# Patient Record
Sex: MALE | Race: WHITE | NOT HISPANIC OR LATINO | URBAN - METROPOLITAN AREA
[De-identification: names, ages, dates, MRNs, and addresses within clinical notes are randomized per-mention and may not be internally consistent; named-entity substitution may affect disease eponyms.]

---

## 2023-03-07 ENCOUNTER — HOSPITAL ENCOUNTER (INPATIENT)
Facility: HOSPITAL | Age: 63
LOS: 7 days | Discharge: NURSING, REHAB OR LONG-TERM CARE | DRG: 065 | End: 2023-03-15
Attending: EMERGENCY MEDICINE | Admitting: STUDENT IN AN ORGANIZED HEALTH CARE EDUCATION/TRAINING PROGRAM
Payer: MEDICARE

## 2023-03-07 DIAGNOSIS — R07.9 CHEST PAIN: ICD-10-CM

## 2023-03-07 DIAGNOSIS — R26.2 AMBULATORY DYSFUNCTION: Primary | ICD-10-CM

## 2023-03-07 DIAGNOSIS — R53.1 GENERALIZED WEAKNESS: ICD-10-CM

## 2023-03-07 DIAGNOSIS — I63.9 STROKE: ICD-10-CM

## 2023-03-07 LAB
ALBUMIN SERPL-MCNC: 3.2 G/DL (ref 3.4–4.8)
ALBUMIN/GLOB SERPL: 1.1 RATIO (ref 1.1–2)
ALP SERPL-CCNC: 102 UNIT/L (ref 40–150)
ALT SERPL-CCNC: 25 UNIT/L (ref 0–55)
APPEARANCE UR: ABNORMAL
AST SERPL-CCNC: 19 UNIT/L (ref 5–34)
BACTERIA #/AREA URNS AUTO: ABNORMAL /HPF
BASOPHILS # BLD AUTO: 0.05 X10(3)/MCL (ref 0–0.2)
BASOPHILS NFR BLD AUTO: 0.5 %
BILIRUB UR QL STRIP.AUTO: NEGATIVE MG/DL
BILIRUBIN DIRECT+TOT PNL SERPL-MCNC: 0.9 MG/DL
BUN SERPL-MCNC: 5.8 MG/DL (ref 8.4–25.7)
CALCIUM SERPL-MCNC: 8.8 MG/DL (ref 8.8–10)
CHLORIDE SERPL-SCNC: 105 MMOL/L (ref 98–107)
CK SERPL-CCNC: 49 U/L (ref 30–200)
CO2 SERPL-SCNC: 25 MMOL/L (ref 23–31)
COLOR UR AUTO: ABNORMAL
CREAT SERPL-MCNC: 0.69 MG/DL (ref 0.73–1.18)
EOSINOPHIL # BLD AUTO: 0.23 X10(3)/MCL (ref 0–0.9)
EOSINOPHIL NFR BLD AUTO: 2.4 %
ERYTHROCYTE [DISTWIDTH] IN BLOOD BY AUTOMATED COUNT: 13.3 % (ref 11.5–17)
GFR SERPLBLD CREATININE-BSD FMLA CKD-EPI: >60 MLS/MIN/1.73/M2
GLOBULIN SER-MCNC: 2.9 GM/DL (ref 2.4–3.5)
GLUCOSE SERPL-MCNC: 95 MG/DL (ref 82–115)
GLUCOSE UR QL STRIP.AUTO: NORMAL MG/DL
HCT VFR BLD AUTO: 46.3 % (ref 42–52)
HGB BLD-MCNC: 15.5 G/DL (ref 14–18)
HYALINE CASTS #/AREA URNS LPF: ABNORMAL /LPF
IMM GRANULOCYTES # BLD AUTO: 0.04 X10(3)/MCL (ref 0–0.04)
IMM GRANULOCYTES NFR BLD AUTO: 0.4 %
KETONES UR QL STRIP.AUTO: NEGATIVE MG/DL
LEUKOCYTE ESTERASE UR QL STRIP.AUTO: NEGATIVE UNIT/L
LYMPHOCYTES # BLD AUTO: 1.78 X10(3)/MCL (ref 0.6–4.6)
LYMPHOCYTES NFR BLD AUTO: 18.3 %
MAGNESIUM SERPL-MCNC: 2.1 MG/DL (ref 1.6–2.6)
MCH RBC QN AUTO: 29.6 PG
MCHC RBC AUTO-ENTMCNC: 33.5 G/DL (ref 33–36)
MCV RBC AUTO: 88.4 FL (ref 80–94)
MONOCYTES # BLD AUTO: 0.82 X10(3)/MCL (ref 0.1–1.3)
MONOCYTES NFR BLD AUTO: 8.4 %
MUCOUS THREADS URNS QL MICRO: ABNORMAL /LPF
NEUTROPHILS # BLD AUTO: 6.81 X10(3)/MCL (ref 2.1–9.2)
NEUTROPHILS NFR BLD AUTO: 70 %
NITRITE UR QL STRIP.AUTO: NEGATIVE
NRBC BLD AUTO-RTO: 0 %
PH UR STRIP.AUTO: 8 [PH]
PLATELET # BLD AUTO: 200 X10(3)/MCL (ref 130–400)
PMV BLD AUTO: 11.2 FL (ref 7.4–10.4)
POTASSIUM SERPL-SCNC: 3 MMOL/L (ref 3.5–5.1)
PROT SERPL-MCNC: 6.1 GM/DL (ref 5.8–7.6)
PROT UR QL STRIP.AUTO: NEGATIVE MG/DL
RBC # BLD AUTO: 5.24 X10(6)/MCL (ref 4.7–6.1)
RBC #/AREA URNS AUTO: ABNORMAL /HPF
RBC UR QL AUTO: NEGATIVE UNIT/L
SODIUM SERPL-SCNC: 140 MMOL/L (ref 136–145)
SP GR UR STRIP.AUTO: 1.01
SQUAMOUS #/AREA URNS LPF: ABNORMAL /HPF
TSH SERPL-ACNC: 1.59 UIU/ML (ref 0.35–4.94)
UNIDENT CRYS #/AREA URNS HPF: ABNORMAL /HPF
UROBILINOGEN UR STRIP-ACNC: ABNORMAL MG/DL
WBC # SPEC AUTO: 9.7 X10(3)/MCL (ref 4.5–11.5)
WBC #/AREA URNS AUTO: ABNORMAL /HPF
YEAST BUDDING URNS QL: ABNORMAL /HPF

## 2023-03-07 PROCEDURE — 83735 ASSAY OF MAGNESIUM: CPT | Performed by: EMERGENCY MEDICINE

## 2023-03-07 PROCEDURE — 63600175 PHARM REV CODE 636 W HCPCS: Performed by: EMERGENCY MEDICINE

## 2023-03-07 PROCEDURE — 93005 ELECTROCARDIOGRAM TRACING: CPT

## 2023-03-07 PROCEDURE — 85025 COMPLETE CBC W/AUTO DIFF WBC: CPT | Performed by: EMERGENCY MEDICINE

## 2023-03-07 PROCEDURE — 99285 EMERGENCY DEPT VISIT HI MDM: CPT | Mod: 25

## 2023-03-07 PROCEDURE — 51798 US URINE CAPACITY MEASURE: CPT

## 2023-03-07 PROCEDURE — 80053 COMPREHEN METABOLIC PANEL: CPT | Performed by: EMERGENCY MEDICINE

## 2023-03-07 PROCEDURE — 81001 URINALYSIS AUTO W/SCOPE: CPT | Performed by: EMERGENCY MEDICINE

## 2023-03-07 PROCEDURE — 96360 HYDRATION IV INFUSION INIT: CPT

## 2023-03-07 PROCEDURE — 80307 DRUG TEST PRSMV CHEM ANLYZR: CPT | Performed by: STUDENT IN AN ORGANIZED HEALTH CARE EDUCATION/TRAINING PROGRAM

## 2023-03-07 PROCEDURE — 84443 ASSAY THYROID STIM HORMONE: CPT | Performed by: EMERGENCY MEDICINE

## 2023-03-07 PROCEDURE — 82550 ASSAY OF CK (CPK): CPT | Performed by: EMERGENCY MEDICINE

## 2023-03-07 RX ADMIN — SODIUM CHLORIDE, POTASSIUM CHLORIDE, SODIUM LACTATE AND CALCIUM CHLORIDE 1000 ML: 600; 310; 30; 20 INJECTION, SOLUTION INTRAVENOUS at 10:03

## 2023-03-07 NOTE — Clinical Note
Diagnosis: Ambulatory dysfunction [[id number]]   Future Attending Provider: TAWNY CLEARY [[id number]]   Admitting Provider:: TAWNY CLEARY [[id number]]   Special Needs:: No Special Needs [1]

## 2023-03-08 PROBLEM — I63.81 LACUNAR STROKE, ACUTE: Status: ACTIVE | Noted: 2023-03-08

## 2023-03-08 PROBLEM — I10 HYPERTENSION: Status: ACTIVE | Noted: 2023-03-08

## 2023-03-08 PROBLEM — R62.7 FAILURE TO THRIVE IN ADULT: Status: ACTIVE | Noted: 2023-03-08

## 2023-03-08 PROBLEM — R29.898 WEAKNESS OF LEFT LOWER EXTREMITY: Status: ACTIVE | Noted: 2023-03-08

## 2023-03-08 LAB
AMPHET UR QL SCN: NEGATIVE
AORTIC ROOT ANNULUS: 3.5 CM
AV INDEX (PROSTH): 0.88
AV MEAN GRADIENT: 2 MMHG
AV PEAK GRADIENT: 4 MMHG
AV VALVE AREA: 2.71 CM2
AV VELOCITY RATIO: 0.91
BARBITURATE SCN PRESENT UR: NEGATIVE
BENZODIAZ UR QL SCN: NEGATIVE
CANNABINOIDS UR QL SCN: NEGATIVE
COCAINE UR QL SCN: NEGATIVE
CRP SERPL-MCNC: 36.4 MG/L
DOP CALC AO PEAK VEL: 1.05 M/S
DOP CALC AO VTI: 23.7 CM
DOP CALC LVOT AREA: 3.1 CM2
DOP CALC LVOT DIAMETER: 1.98 CM
DOP CALC LVOT PEAK VEL: 0.96 M/S
DOP CALC LVOT STROKE VOLUME: 64.32 CM3
DOP CALC MV VTI: 22.3 CM
DOP CALCLVOT PEAK VEL VTI: 20.9 CM
E WAVE DECELERATION TIME: 130.91 MSEC
E/A RATIO: 0.82
E/E' RATIO: 4.48 M/S
EJECTION FRACTION: 60 %
FENTANYL UR QL SCN: NEGATIVE
HR MV ECHO: 68 BPM
LEFT ATRIUM SIZE: 3.58 CM
LEFT ATRIUM VOLUME MOD: 30 CM3
LEFT VENTRICLE DIASTOLIC VOLUME: 75 ML
LEFT VENTRICLE SYSTOLIC VOLUME: 33 ML
LV LATERAL E/E' RATIO: 3.62 M/S
LV SEPTAL E/E' RATIO: 5.88 M/S
LVOT MG: 1.96 MMHG
LVOT MV: 0.66 CM/S
MAGNESIUM SERPL-MCNC: 2.1 MG/DL (ref 1.6–2.6)
MDMA UR QL SCN: NEGATIVE
MV MEAN GRADIENT: 1 MMHG
MV PEAK A VEL: 0.57 M/S
MV PEAK E VEL: 0.47 M/S
MV PEAK GRADIENT: 3 MMHG
MV VALVE AREA BY CONTINUITY EQUATION: 2.88 CM2
OPIATES UR QL SCN: NEGATIVE
PCP UR QL: NEGATIVE
PH UR: 8 [PH] (ref 3–11)
PHOSPHATE SERPL-MCNC: 3.3 MG/DL (ref 2.3–4.7)
RA PRESSURE: 3 MMHG
RA WIDTH: 4.4 CM
TDI LATERAL: 0.13 M/S
TDI SEPTAL: 0.08 M/S
TDI: 0.11 M/S
TRICUSPID ANNULAR PLANE SYSTOLIC EXCURSION: 1.72 CM

## 2023-03-08 PROCEDURE — 94640 AIRWAY INHALATION TREATMENT: CPT

## 2023-03-08 PROCEDURE — 86140 C-REACTIVE PROTEIN: CPT

## 2023-03-08 PROCEDURE — 25500020 PHARM REV CODE 255

## 2023-03-08 PROCEDURE — 99900035 HC TECH TIME PER 15 MIN (STAT)

## 2023-03-08 PROCEDURE — 25000003 PHARM REV CODE 250

## 2023-03-08 PROCEDURE — 21400001 HC TELEMETRY ROOM

## 2023-03-08 PROCEDURE — 25000003 PHARM REV CODE 250: Performed by: STUDENT IN AN ORGANIZED HEALTH CARE EDUCATION/TRAINING PROGRAM

## 2023-03-08 PROCEDURE — A9577 INJ MULTIHANCE: HCPCS

## 2023-03-08 PROCEDURE — 63600175 PHARM REV CODE 636 W HCPCS: Performed by: FAMILY MEDICINE

## 2023-03-08 PROCEDURE — 84100 ASSAY OF PHOSPHORUS: CPT | Performed by: FAMILY MEDICINE

## 2023-03-08 PROCEDURE — 25000242 PHARM REV CODE 250 ALT 637 W/ HCPCS: Performed by: FAMILY MEDICINE

## 2023-03-08 PROCEDURE — 83735 ASSAY OF MAGNESIUM: CPT | Performed by: FAMILY MEDICINE

## 2023-03-08 RX ORDER — POTASSIUM CHLORIDE 20 MEQ/1
40 TABLET, EXTENDED RELEASE ORAL EVERY 6 HOURS
Status: COMPLETED | OUTPATIENT
Start: 2023-03-08 | End: 2023-03-08

## 2023-03-08 RX ORDER — CLOPIDOGREL BISULFATE 75 MG/1
75 TABLET ORAL DAILY
Status: DISCONTINUED | OUTPATIENT
Start: 2023-03-08 | End: 2023-03-15 | Stop reason: HOSPADM

## 2023-03-08 RX ORDER — ATORVASTATIN CALCIUM 40 MG/1
80 TABLET, FILM COATED ORAL NIGHTLY
Status: DISCONTINUED | OUTPATIENT
Start: 2023-03-08 | End: 2023-03-15 | Stop reason: HOSPADM

## 2023-03-08 RX ORDER — ENOXAPARIN SODIUM 100 MG/ML
40 INJECTION SUBCUTANEOUS EVERY 24 HOURS
Status: DISCONTINUED | OUTPATIENT
Start: 2023-03-08 | End: 2023-03-15 | Stop reason: HOSPADM

## 2023-03-08 RX ORDER — LABETALOL HCL 20 MG/4 ML
10 SYRINGE (ML) INTRAVENOUS EVERY 4 HOURS PRN
Status: DISCONTINUED | OUTPATIENT
Start: 2023-03-08 | End: 2023-03-15 | Stop reason: HOSPADM

## 2023-03-08 RX ORDER — ASPIRIN 325 MG
325 TABLET ORAL ONCE
Status: COMPLETED | OUTPATIENT
Start: 2023-03-08 | End: 2023-03-08

## 2023-03-08 RX ORDER — NAPROXEN SODIUM 220 MG/1
81 TABLET, FILM COATED ORAL DAILY
Status: DISCONTINUED | OUTPATIENT
Start: 2023-03-09 | End: 2023-03-15 | Stop reason: HOSPADM

## 2023-03-08 RX ORDER — NALOXONE HCL 0.4 MG/ML
0.02 VIAL (ML) INJECTION
Status: DISCONTINUED | OUTPATIENT
Start: 2023-03-08 | End: 2023-03-15 | Stop reason: HOSPADM

## 2023-03-08 RX ORDER — ASPIRIN 325 MG
325 TABLET ORAL DAILY
Status: DISCONTINUED | OUTPATIENT
Start: 2023-03-08 | End: 2023-03-08

## 2023-03-08 RX ORDER — HYDRALAZINE HYDROCHLORIDE 20 MG/ML
10 INJECTION INTRAMUSCULAR; INTRAVENOUS EVERY 4 HOURS PRN
Status: DISCONTINUED | OUTPATIENT
Start: 2023-03-08 | End: 2023-03-15 | Stop reason: HOSPADM

## 2023-03-08 RX ORDER — SODIUM CHLORIDE 0.9 % (FLUSH) 0.9 %
10 SYRINGE (ML) INJECTION EVERY 12 HOURS PRN
Status: DISCONTINUED | OUTPATIENT
Start: 2023-03-08 | End: 2023-03-15 | Stop reason: HOSPADM

## 2023-03-08 RX ORDER — IPRATROPIUM BROMIDE AND ALBUTEROL SULFATE 2.5; .5 MG/3ML; MG/3ML
3 SOLUTION RESPIRATORY (INHALATION) EVERY 6 HOURS PRN
Status: DISCONTINUED | OUTPATIENT
Start: 2023-03-08 | End: 2023-03-09

## 2023-03-08 RX ADMIN — POTASSIUM CHLORIDE 40 MEQ: 1500 TABLET, EXTENDED RELEASE ORAL at 11:03

## 2023-03-08 RX ADMIN — CLOPIDOGREL BISULFATE 75 MG: 75 TABLET, FILM COATED ORAL at 11:03

## 2023-03-08 RX ADMIN — POTASSIUM CHLORIDE 40 MEQ: 1500 TABLET, EXTENDED RELEASE ORAL at 06:03

## 2023-03-08 RX ADMIN — ENOXAPARIN SODIUM 40 MG: 40 INJECTION SUBCUTANEOUS at 06:03

## 2023-03-08 RX ADMIN — IPRATROPIUM BROMIDE AND ALBUTEROL SULFATE 3 ML: 2.5; .5 SOLUTION RESPIRATORY (INHALATION) at 02:03

## 2023-03-08 RX ADMIN — ASPIRIN 325 MG ORAL TABLET 325 MG: 325 PILL ORAL at 11:03

## 2023-03-08 RX ADMIN — GADOBENATE DIMEGLUMINE 16 ML: 529 INJECTION, SOLUTION INTRAVENOUS at 09:03

## 2023-03-08 RX ADMIN — ATORVASTATIN CALCIUM 80 MG: 40 TABLET, FILM COATED ORAL at 09:03

## 2023-03-08 NOTE — ED PROVIDER NOTES
Encounter Date: 3/7/2023       History     Chief Complaint   Patient presents with    Fall     Pt arrives via [facility] after he states he fell on Monday and is complaining of lower extremity weakness and back pain; pt also states that he has been unable to walk x1 year and lives alone     Mr. Geoffrey Smith is a 63 yo male no known PMHx who presents with chief complaint weakness. Onset was year and a half ago when patient states he began having some weakness in his legs that has been constant.  He reports that due to the weakness in his legs he has had multiple falls.  EMS reports that he would reportedly fallen a while back and had laid on the floor of his camper for about 5 days until a neighbor finally found him and brought him to his brother's house to live.  His brother called EMS because he was unable to care for him, and that he had been urinating and defecating on himself.  Patient states that he feels the urge to urinate and have a bowel movement, but is unable to make it to the bathroom in time due to his weakness so he ends up going to the bathroom on himself.  He states that this has been going on for at least 6 months.  He does report having some mid low back pain that has been chronic for at least 5 years, has not changed as result of his falls. Denies having any numbness or tingling in extremities, dysuria, saddle anesthesia, fever, nausea, vomiting.    The history is provided by the patient and the EMS personnel.   Review of patient's allergies indicates:  No Known Allergies  History reviewed. No pertinent past medical history.  No past surgical history on file.  History reviewed. No pertinent family history.     Review of Systems    Physical Exam     Initial Vitals [03/07/23 2153]   BP Pulse Resp Temp SpO2   (!) 180/116 89 18 98.2 °F (36.8 °C) 97 %      MAP       --         Physical Exam    Nursing note and vitals reviewed.  Constitutional: He appears well-developed and well-nourished. No distress.    HENT:   Head: Normocephalic and atraumatic.   Nose: Nose normal.   Mouth/Throat: Oropharynx is clear and moist and mucous membranes are normal.   Eyes: Conjunctivae and EOM are normal. Pupils are equal, round, and reactive to light.   Neck: Neck supple. No tracheal deviation present.   Cardiovascular:  Normal rate, regular rhythm, normal heart sounds, intact distal pulses and normal pulses.           Pulmonary/Chest: Effort normal and breath sounds normal. No respiratory distress.   Abdominal: Abdomen is soft. There is no abdominal tenderness. There is no rebound and no guarding.   Musculoskeletal:         General: Normal range of motion.      Cervical back: Neck supple. No bony tenderness.      Thoracic back: No bony tenderness.      Lumbar back: No bony tenderness.     Neurological: He is alert. He is disoriented. No cranial nerve deficit or sensory deficit. He exhibits abnormal muscle tone. GCS score is 15. GCS eye subscore is 4. GCS verbal subscore is 4. GCS motor subscore is 6.   Reflex Scores:       Tricep reflexes are 2+ on the right side and 2+ on the left side.       Bicep reflexes are 2+ on the right side and 2+ on the left side.       Patellar reflexes are 2+ on the right side and 2+ on the left side.  Left hemiparesis. Oriented to person and place only.   Skin: Skin is warm, dry and intact.   Psychiatric: He has a normal mood and affect. His speech is normal and behavior is normal. Judgment and thought content normal. Cognition and memory are impaired.       ED Course   Procedures  Labs Reviewed   COMPREHENSIVE METABOLIC PANEL - Abnormal; Notable for the following components:       Result Value    Potassium Level 3.0 (*)     Blood Urea Nitrogen 5.8 (*)     Creatinine 0.69 (*)     Albumin Level 3.2 (*)     All other components within normal limits   URINALYSIS, REFLEX TO URINE CULTURE - Abnormal; Notable for the following components:    Appearance, UA Turbid (*)     Urobilinogen, UA 2+ (*)     Budding  Yeast, UA Occ (*)     Mucous, UA Trace (*)     Unclassified Crystal, UA Occ (*)     All other components within normal limits   CBC WITH DIFFERENTIAL - Abnormal; Notable for the following components:    MPV 11.2 (*)     All other components within normal limits   MAGNESIUM - Normal   TSH - Normal   CK - Normal   CBC W/ AUTO DIFFERENTIAL    Narrative:     The following orders were created for panel order CBC auto differential.  Procedure                               Abnormality         Status                     ---------                               -----------         ------                     CBC with Differential[[id number]]        Abnormal            Final result                 Please view results for these tests on the individual orders.   EXTRA TUBES    Narrative:     The following orders were created for panel order EXTRA TUBES.  Procedure                               Abnormality         Status                     ---------                               -----------         ------                     Light Blue Top Hold[[id number]]                              In process                   Please view results for these tests on the individual orders.   LIGHT BLUE TOP HOLD          Imaging Results              CT Lumbar Spine Without Contrast (Preliminary result)  Result time 03/08/23 01:32:36      Preliminary result by Gregorio Vigil Jr., MD (03/08/23 01:32:36)                   Narrative:    START OF REPORT:  TECHNIQUE: CT OF THE LUMBAR SPINE WAS PERFORMED WITHOUT INTRAVENOUS CONTRAST WITH DIRECT AXIAL AS WELL AS SAGITTAL AND CORONAL RECONSTRUCTION IMAGES.    COMPARISON: NONE.    CLINICAL HISTORY: FALL (PT ARRIVES VIA [facility] AFTER HE STATES HE FELL ON MONDAY AND IS COMPLAINING OF LOWER EXTREMITY WEAKNESS AND BACK PAIN; PT ALSO STATES THAT HE HAS BEEN UNABLE TO WALK X1 YEAR AND LIVES ALONE.    Findings:  Anatomy: Unremarkable.  Mineralization: The bony mineralization is within normal limits.  Congenital:  None.  Bone alignment: Unremarkable with no significant listhesis.  Curvature: There is straightening of the lumbar lordotic curvature. This may be positional or reflect an element of myospasm.  Bone and bone marrow: The vertebral body heights are maintained.  Intervertebral disc spaces: The intervertebral discs are preserved throughout.  Osteophytes: There are tiny marginal osteophytes at L2 down through L5.  Facet degenerative changes: Mild facet degenerative changes are seen at L4-L5 L5-S1.  Spinal canal: Unremarkable with no bony spinal canal stenosis identified.  Fractures: No acute fracture dislocation or subluxation is seen.  Orthopedic Hardware: None.  Vertebral Fusion: None.      Impression:  1. No acute fracture dislocation or subluxation is seen.  2. Degenerative changes and other findings as above.                          Preliminary result by Interface, Rad Results In (03/08/23 01:32:36)                   Narrative:    START OF REPORT:  TECHNIQUE: CT OF THE LUMBAR SPINE WAS PERFORMED WITHOUT INTRAVENOUS CONTRAST WITH DIRECT AXIAL AS WELL AS SAGITTAL AND CORONAL RECONSTRUCTION IMAGES.    COMPARISON: NONE.    CLINICAL HISTORY: FALL (PT ARRIVES VIA [facility] AFTER HE STATES HE FELL ON MONDAY AND IS COMPLAINING OF LOWER EXTREMITY WEAKNESS AND BACK PAIN; PT ALSO STATES THAT HE HAS BEEN UNABLE TO WALK X1 YEAR AND LIVES ALONE.    Findings:  Anatomy: Unremarkable.  Mineralization: The bony mineralization is within normal limits.  Congenital: None.  Bone alignment: Unremarkable with no significant listhesis.  Curvature: There is straightening of the lumbar lordotic curvature. This may be positional or reflect an element of myospasm.  Bone and bone marrow: The vertebral body heights are maintained.  Intervertebral disc spaces: The intervertebral discs are preserved throughout.  Osteophytes: There are tiny marginal osteophytes at L2 down through L5.  Facet degenerative changes: Mild facet degenerative changes are seen  at L4-L5 L5-S1.  Spinal canal: Unremarkable with no bony spinal canal stenosis identified.  Fractures: No acute fracture dislocation or subluxation is seen.  Orthopedic Hardware: None.  Vertebral Fusion: None.      Impression:  1. No acute fracture dislocation or subluxation is seen.  2. Degenerative changes and other findings as above.                                         CT Head Without Contrast (Preliminary result)  Result time 03/07/23 23:56:15      Preliminary result by Gregorio Vigil Jr., MD (03/07/23 23:56:15)                   Narrative:    START OF REPORT:  TECHNIQUE: CT OF THE HEAD WAS PERFORMED WITHOUT INTRAVENOUS CONTRAST WITH AXIAL AS WELL AS CORONAL AND SAGITTAL IMAGES.    COMPARISON: NONE.    DOSAGE INFORMATION: AUTOMATED EXPOSURE CONTROL WAS UTILIZED.    CLINICAL HISTORY: FALL (PT ARRIVES VIA [facility] AFTER HE STATES HE FELL ON MONDAY AND IS COMPLAINING OF LOWER EXTREMITY WEAKNESS AND BACK PAIN; PT ALSO STATES THAT HE HAS BEEN UNABLE TO WALK X1 YEAR AND LIVES ALONE.    Findings:  Hemorrhage: No acute intracranial hemorrhage is seen.  CSF spaces: The ventricles, sulci and basal cisterns all appear moderately prominent consistent with global cerebral atrophy.  Brain parenchyma: Pronounced microvascular change is seen in portions of the periventricular and deep white matter tracts.  Cerebellum: Unremarkable.  Sella and skull base: The sella appears to be within normal limits for age.  Intracranial calcifications: Incidental note is made of bilateral choroid plexus calcification. Incidental note is made of some pineal region calcification.  Calvarium: No acute linear or depressed skull fracture is seen.    Maxillofacial Structures:  Paranasal sinuses: The visualized paranasal sinuses appear clear with no significant mucoperiosteal thickening or air fluid levels identified.  Orbits: The orbits appear unremarkable.  Zygomatic arches: The zygomatic arches are intact and unremarkable.  Temporal bones  and mastoids: The temporal bones and mastoids appear unremarkable.  TMJ: The mandibular condyles appear normally placed with respect to the mandibular fossa.      Impression:  1. No acute intracranial process identified. Details and findings as noted above.                          Preliminary result by Interface, Rad Results In (03/07/23 23:56:15)                   Narrative:    START OF REPORT:  TECHNIQUE: CT OF THE HEAD WAS PERFORMED WITHOUT INTRAVENOUS CONTRAST WITH AXIAL AS WELL AS CORONAL AND SAGITTAL IMAGES.    COMPARISON: NONE.    DOSAGE INFORMATION: AUTOMATED EXPOSURE CONTROL WAS UTILIZED.    CLINICAL HISTORY: FALL (PT ARRIVES VIA [facility] AFTER HE STATES HE FELL ON MONDAY AND IS COMPLAINING OF LOWER EXTREMITY WEAKNESS AND BACK PAIN; PT ALSO STATES THAT HE HAS BEEN UNABLE TO WALK X1 YEAR AND LIVES ALONE.    Findings:  Hemorrhage: No acute intracranial hemorrhage is seen.  CSF spaces: The ventricles, sulci and basal cisterns all appear moderately prominent consistent with global cerebral atrophy.  Brain parenchyma: Pronounced microvascular change is seen in portions of the periventricular and deep white matter tracts.  Cerebellum: Unremarkable.  Sella and skull base: The sella appears to be within normal limits for age.  Intracranial calcifications: Incidental note is made of bilateral choroid plexus calcification. Incidental note is made of some pineal region calcification.  Calvarium: No acute linear or depressed skull fracture is seen.    Maxillofacial Structures:  Paranasal sinuses: The visualized paranasal sinuses appear clear with no significant mucoperiosteal thickening or air fluid levels identified.  Orbits: The orbits appear unremarkable.  Zygomatic arches: The zygomatic arches are intact and unremarkable.  Temporal bones and mastoids: The temporal bones and mastoids appear unremarkable.  TMJ: The mandibular condyles appear normally placed with respect to the mandibular fossa.      Impression:  1. No  acute intracranial process identified. Details and findings as noted above.                                         X-Ray Chest AP Portable (Final result)  Result time 03/07/23 22:26:35      Final result by Sharath Tello MD (03/07/23 22:26:35)                   Impression:      NO ACUTE CARDIOPULMONARY PROCESS IDENTIFIED.      Electronically signed by: Sharath Tello  Date:    03/07/2023  Time:    22:26               Narrative:    EXAMINATION:  XR CHEST AP PORTABLE    CLINICAL HISTORY:  smoker;    TECHNIQUE:  One view    COMPARISON:  None available.    FINDINGS:  Cardiopericardial silhouette is within normal limits. Lungs are without dense focal or segmental consolidation, congestive process, pleural effusions or pneumothorax.                                       Medications   sodium chloride 0.9% flush 10 mL (has no administration in time range)   enoxaparin injection 40 mg (has no administration in time range)   naloxone 0.4 mg/mL injection 0.02 mg (has no administration in time range)   albuterol-ipratropium 2.5 mg-0.5 mg/3 mL nebulizer solution 3 mL (has no administration in time range)   lactated ringers bolus 1,000 mL (0 mLs Intravenous Stopped 3/7/23 9384)     Medical Decision Making:   Initial Assessment:   61 yo male presents with generalized weakness that has been going on for over a year now. He has left hemiparesis and has obviously had a stroke at some point in time. He does report having pain in his low mid back that he states is chronic and has not gotten worse as a result of his falls, he does not have any midline spine tenderness of the lumbar spine.  CT head shows pronounced chronic microvascular changes, no acute findings.  Labs are grossly unremarkable.  Will admit for further medical evaluation and treatment, suspect he will need some type of long-term care facility since his brother is unable to care for him and he would not be safe to try to live independently.  I have spoken with the patient  and/or caregivers. I have explained the patient's condition, diagnoses and treatment plan based on the information available to me at this time. I have answered the patient's and/or caregiver's questions and addressed any concerns. The patient and/or caregivers have as good an understanding of the patient's diagnosis, condition and treatment plan as can be expected at this point. The patient has been stabilized within the capability of the emergency department. The patient will be transported for further care and management or will be moved to an observation or inpatient service. I have communicated with the staff or medical practitioner taking over this patient's care.  Clinical Tests:   Lab Tests: Ordered and Reviewed  Radiological Study: Ordered and Reviewed  Additional MDM:     NIH Stroke Scale:   Level of consciousness = 0 - alert  LOC questions = 2 - answers none correctly  LOC commands = 0 - performs both correctly  Best gaze = 0 - normal  Visual = 0 - no visual loss  Facial palsy = 0 - normal  Motor left arm =  1 - drift  Motor right arm =  0 - no drift  Motor left leg = 2 - can't resist gravity  Motor right leg =  0 - no drift  Limb ataxia = 0 - absent  Sensory = 0 - normal  Best language = 0 - no aphasia  Dysarthria = 0 - normal articulation  Extinction and inattention = 0 - no neglect  NIH Stroke Scale Total = 5                     Clinical Impression:   Final diagnoses:  [R53.1] Generalized weakness  [R26.2] Ambulatory dysfunction (Primary)        ED Disposition Condition    Observation                 Bill Broderick DO  03/08/23 0106

## 2023-03-08 NOTE — CLINICAL REVIEW
Reviewed on 3/8/2023 by Delia LIVINGSTON MD       Created Using Review Status Review Entered   Epic In [facility] 3/8/2023 0922       Created By   Delia LIVINGSTON MD       Criteria Set Name - Subset   PA review      Criteria Review   62-year-old male admitted on 03/07/2023 with weakness, frequent falls.  No significant past medical history.  On admission, significantly hypertensive with a blood pressure of 118/116.  CT scan of the head was normal.  Chest x-ray was normal.  There is no evidence of hemodynamic instability, acute encephalopathy, hypoxia, hypercapnia, arrhythmia immediate concern, food intolerance, medication tolerance, renal failure, infection, bacteremia, or the need for surgical intervention.  At the present time, the patient remains appropriate for observation level of care      Delia Luna MD  Utilization Management  Physician Advisor

## 2023-03-08 NOTE — PROGRESS NOTES
[facility]  Hospital Medicine  Progress Note    Patient Name: Geoffrey Smith  MRN: [id number]  Patient Class: IP- Inpatient   Admission Date: 3/7/2023  Length of Stay: 1 days  Attending Physician: Jagjit Tavarez MD  Primary Care Provider: none        Subjective:     Principal Problem:Lacunar stroke, acute        HPI:  Geoffrey Smith is a 62 y.o. male who has not seen a physician in many years and his only past medical history is nicotine use disorder. The patient presented to [facility] ED on 3/7/2023 with a primary complaint of frequent falls.  He states that he most recently fell on Monday however he denies any significant pain throughout.  He reports that he is had loss of bladder and bowel continence over the past year and has required depends for several months.  He states that he has not walked in the last year.  He was living on his own in a trailer prior to the past week where he was able to move in with his older brother.  His older brother felt as though he could not take care of him safely and had EMS picked him up to be hospitalized and seek nursing home care.  Patient states that he has no complaints at this time besides the lower extremity weakness and some mild back pain.    Interval History: Patient did well overnight with NAEON. He tolerated his breakfast well this morning without vomiting. He is receptive to speaking with CM about possibly SNF or NH placement. He endorses feeling symmetric weakness in his arms and legs with no loss of sensation. He did not have any incontinence overnight.     Review of Systems   Constitutional:  Negative for appetite change.   HENT:  Negative for trouble swallowing.    Eyes:  Negative for visual disturbance (denies acute vision changes).   Respiratory:  Negative for shortness of breath and wheezing.    Cardiovascular:  Negative for chest pain and leg swelling.   Gastrointestinal:  Negative for abdominal pain, blood in stool, constipation, diarrhea,  nausea and vomiting.   Genitourinary:  Negative for decreased urine volume, dysuria, flank pain and hematuria.        Endorses urinary incontinence    Musculoskeletal:  Positive for back pain (chronic mid to lower back pain).   Skin:  Negative for rash and wound.   Neurological:  Positive for weakness. Negative for tremors, seizures, facial asymmetry, speech difficulty, numbness and headaches.        Endorses increased falls at home due to muscle and leg weakness.   Psychiatric/Behavioral:  Negative for sleep disturbance.    Objective:     Vital Signs (Most Recent):  Temp: 98.6 °F (37 °C) (03/08/23 0800)  Pulse: 60 (03/08/23 0500)  Resp: 12 (03/08/23 0500)  BP: (!) 138/118 (03/08/23 0800)  SpO2: 96 % (03/08/23 0815)   Vital Signs (24h Range):  Temp:  [97.7 °F (36.5 °C)-98.6 °F (37 °C)] 98.6 °F (37 °C)  Pulse:  [60-89] 60  Resp:  [10-25] 12  SpO2:  [93 %-98 %] 96 %  BP: (121-180)/() 138/118     Weight: 76.2 kg (167 lb 14.4 oz)  There is no height or weight on file to calculate BMI.    Intake/Output Summary (Last 24 hours) at 3/8/2023 1119  Last data filed at 3/7/2023 2353  Gross per 24 hour   Intake 999 ml   Output --   Net 999 ml      Physical Exam  Constitutional:       General: He is not in acute distress.     Comments: Elderly appearing white male lying in bed. Appears weak and speaks/answers questions slowly. (Unsure of his baselines)   HENT:      Right Ear: External ear normal.      Left Ear: External ear normal.      Mouth/Throat:      Mouth: Mucous membranes are moist.      Comments: No teeth present  Eyes:      General: No scleral icterus.     Extraocular Movements: Extraocular movements intact.      Pupils: Pupils are equal, round, and reactive to light.   Cardiovascular:      Rate and Rhythm: Normal rate and regular rhythm.      Pulses: Normal pulses.      Heart sounds: Normal heart sounds. No murmur heard.  Pulmonary:      Effort: Pulmonary effort is normal.      Breath sounds: Normal breath sounds.  No wheezing, rhonchi or rales.   Abdominal:      General: Bowel sounds are normal. There is no distension.      Palpations: Abdomen is soft. There is no mass.      Tenderness: There is abdominal tenderness (suprapubic tenderness). There is no right CVA tenderness or left CVA tenderness.   Musculoskeletal:         General: No swelling.      Cervical back: Neck supple. No tenderness.      Right lower leg: No edema.      Left lower leg: No edema.   Lymphadenopathy:      Cervical: No cervical adenopathy.   Skin:     General: Skin is warm and dry.      Capillary Refill: Capillary refill takes less than 2 seconds.      Coloration: Skin is not jaundiced.      Comments: B/L nontender calves, equal in size.   Neurological:      Mental Status: He is alert and oriented to person, place, and time. 2+ DTR knee B/L; Babinski reflexes neg B/L.  3/5 strength in LLE; 5/5 RLE strength   5/5 UE strength B/L      Significant Labs: All pertinent labs within the past 24 hours have been reviewed.  Recent Lab Results         03/08/23  0609   03/08/23  0605   03/07/23  2253   03/07/23  2204        Albumin/Globulin Ratio       1.1       Albumin       3.2       Alkaline Phosphatase       102       ALT       25       Appearance, UA     Turbid         AST       19       Bacteria, UA     None Seen         Baso #       0.05       Basophil %       0.5       BILIRUBIN TOTAL       0.9       Bilirubin, UA     Negative         Budding Yeast, UA     Occ         BUN       5.8       Calcium       8.8       Chloride       105       CO2       25       Color, UA     Light-Yellow         CPK       49       Creatinine       0.69       CRP 36.40             eGFR       >60       Eos #       0.23       Eosinophil %       2.4       Globulin, Total       2.9       Glucose       95       Glucose, UA     Normal         Hematocrit       46.3       Hemoglobin       15.5       Hyaline Casts, UA     None Seen         Immature Grans (Abs)       0.04       Immature  Granulocytes       0.4       Ketones, UA     Negative         Leukocytes, UA     Negative         Lymph #       1.78       LYMPH %       18.3       Magnesium   2.10     2.10       MCH       29.6       MCHC       33.5       MCV       88.4       Mono #       0.82       Mono %       8.4       MPV       11.2       Mucous, UA     Trace         Neut #       6.81       Neut %       70.0       NITRITE UA     Negative         nRBC       0.0       Occult Blood UA     Negative         pH, UA     8.0         Phosphorus   3.3           Platelets       200       Potassium       3.0       PROTEIN TOTAL       6.1       Protein, UA     Negative         RBC       5.24       RBC, UA     0-5         RDW       13.3       Sodium       140       Specific Gravity,UA     1.010         Squamous Epithelial Cells, UA     None Seen         Thyroid Stimulating Hormone       1.587       Unclassified Crystal, UA     Occ         Urobilinogen, UA     2+         WBC, UA     0-5         WBC       9.7               Significant Imaging:   Narrative & Impression  EXAMINATION:  MRI BRAIN W WO CONTRAST     CLINICAL HISTORY:  Neuro deficit, persistent/recurrent, CNS neoplasm suspected;     TECHNIQUE:  Multiplanar, multisequence MR images of the brain were obtained with and without administration of intravenous contrast.     COMPARISON:  CT head dated 03/07/2023     FINDINGS:  There is a small focus of restricted diffusion in the right internal capsule.  There is no evidence of hemorrhagic transformation.  There are moderate patchy T2/FLAIR hyperintensities in the subcortical and periventricular white matter and randal, with multiple prior lacunar infarcts in the basal ganglia.  There is no abnormal parenchymal or leptomeningeal enhancement.     There is no mass effect or midline shift.  The basal cisterns are patent.  There is moderate diffuse parenchymal volume loss.  There is no hydrocephalus or abnormal extra-axial fluid collection.  The major intracranial  flow voids are patent.  There is trace scattered paranasal sinus mucosal thickening.  Left mastoid effusion is noted.     Impression:     1. Acute lacunar infarct in the right internal capsule without hemorrhage.  2. Moderate chronic microvascular ischemic changes.        Electronically signed by: Beth Small  Date:                                            03/08/2023  Time:                                           09:50           Exam Ended: 03/08/23 09:28 Last Resulted: 03/08/23 09:50             Assessment/Plan:      Lacunar Infarct (Right sided)  Unilateral Left sided weakness  -NIH stroke score of 5(moderate stroke)  -MRI of brain showed an acute lacunar infarct in the right internal capsule without hemorrhage and chronic microvascular ischemic changes.  -CT of head showed vascular calcifications and prominent patchy hypo attenuation in the cerebral white matter likely associated wit chronic small vessel ischemic changes; few scattered lacunar infarcts  -CT lumbar spine no large disc herniation and mild neural foraminal narrowing bilaterally at L4-L5  -CXR clear without acute cardiopulmonary process identified  -Starting ASA loading dose, Plavix 75 mg daily, a high intensity statin. He will continue the ASA 81mg daily after loading.   -UDS pending       Hypokalemia   -Replaced with PO Kcl   -Replete as needed           CODE STATUS: Full Code  Access: Peripheral  Antibiotics: none  Diet: Regular  GI Prophylaxis: none needed  Fluids:  None  DVT ppx: Lovenox          Disposition: Day 2 of admission for unilateral left sided weakness with deconditioning and failure to thrive.         Ninfa Xiao DO  Department of Hospital Medicine   [facility]

## 2023-03-08 NOTE — H&P
hospitals Internal Medicine History and Physical   Geoffrey Smith [id number]     Date of Admit: 3/7/2023    Chief Complaint     Fall (Pt arrives via [facility] after he states he fell on Monday and is complaining of lower extremity weakness and back pain; pt also states that he has been unable to walk x1 year and lives alone)    Subjective:      History of Present Illness:  Geoffrey Smith is a 62 y.o. male who has not seen a physician in many years and his only past medical history is nicotine use disorder. The patient presented to [facility] ED on 3/7/2023 with a primary complaint of frequent falls.  He states that he most recently fell on Monday however he denies any significant pain throughout.  He reports that he is had loss of bladder and bowel continence over the past year and has required depends for several months.  He states that he has not walked in the last year.  He was living on his own in a trailer prior to the past week where he was able to move in with his older brother.  His older brother felt as though he could not take care of him safely and had EMS picked him up to be hospitalized and seek nursing home care.  Patient states that he has no complaints at this time besides the lower extremity weakness and some mild back pain.    Past Medical History:  Nicotine use disorder    Past Surgical History:  None    Allergies:  NKDA    Home Medications:  None    Family History:  History reviewed. No pertinent family history.    Social History:  Nicotine use disorder 1 PPD for many years    Review of Systems:  Constitutional: no fever, fatigue, weakness  Eye: no vision loss, eye redness, drainage, or pain  ENMT: no sore throat, ear pain, sinus pain/congestion, nasal congestion/drainage  Respiratory: no cough, no wheezing, no shortness of breath  Cardiovascular: no chest pain, no palpitations, no edema  Gastrointestinal: no nausea, vomiting, or diarrhea. No abdominal pain  Genitourinary: no dysuria, no urinary frequency  or urgency, no hematuria  Hema/Lymph: no abnormal bruising or bleeding  Endocrine: no heat or cold intolerance, no excessive thirst or excessive urination  Musculoskeletal: no muscle or joint pain, no joint swelling  Integumentary: no skin rash or abnormal lesion  Neurologic: no headache, no dizziness, + weakness - numbness        Objective:   Last 24 Hour Vital Signs:  Vitals  BP: (!) 180/116  Temp: 98.2 °F (36.8 °C)  Temp Source: Oral  Pulse: 89  Resp: 18  SpO2: 97 %  Weight: 76.2 kg (167 lb 14.4 oz)    Physical Examination:  Vitals:    03/07/23 2153   BP: (!) 180/116   Pulse: 89   Resp: 18   Temp: 98.2 °F (36.8 °C)      GA: Alert, comfortable, no acute distress.   HEENT: Adequate dentition. No visible oropharyngeal abnormalities. No scleral icterus or JVD. No visible thyromegally.   Pulmonary: Chest wall symmetric. No accessory muscle use. No abnormalities on percussion. No tenderness to palpation. Clear to auscultation A/P/L bilaterally.  Minimal wheezing, no crackles, or rhonchi.  Cardiac: RRR S1 & S2 w/o rubs/murmurs/gallops. No lower extremity edema.   Abdominal: Bowel sounds present x 4. No appreciable hepatosplenomegaly.  Skin: No jaundice, rashes, or visible lesions.  Lymphatic: No cervical or inguinal lymphadenopathy.  :  Inadequate anal sphincter tone, depends in place with mild irritation of buttocks but no skin breakdown.  Musculoskeletal:  No paraspinal muscle tenderness, no step-offs, no midline tenderness.  Normal passive range of motion of hips, knees, shoulders, elbows.   Extremities:  No cyanosis but clubbing noted of fingers bilaterally, 1+ pitting edema bilateral lower extremities.  Neuro:  Alert and oriented x3.  1/5 left lower extremity strength of both proximal and distal muscle with minimal DTR, 3/5 upper extremity strength in proximal muscles normal DTR, 1/5 strength of distal muscles in left upper extremity.      Laboratory:  Most Recent Data:  CMP:  Recent Labs   Lab 03/07/23  2204    CHLORIDE 105   CO2 25   BUN 5.8*   CREATININE 0.69*   GLUCOSE 95   ALBUMIN 3.2*   AST 19   ALT 25   ALKPHOS 102     CBC:  Recent Labs   Lab 03/07/23  2204   WBC 9.7   ABSNEUTRO 6.81   RBC 5.24   HGB 15.5   HCT 46.3   MCV 88.4   RDW 13.3     Coags: No results found for: INR, PROTIME, PTT  FLP: No results found for: CHOL, HDL, LDLCALC, TRIG, CHOLHDL  DM:   Lab Results   Component Value Date    CREATININE 0.69 (L) 03/07/2023     Thyroid:   Lab Results   Component Value Date    TSH 1.587 03/07/2023     Anemia: No results found for: IRON, TIBC, FERRITIN, AZRHQGOC33, FOLATE  Cardiac: No results found for: TROPONINI, CKTOTAL, CKMB, BNP  Urinalysis:   Lab Results   Component Value Date    PHUA 8.0 03/07/2023    UROBILINOGEN 2+ (A) 03/07/2023    WBCUA 0-5 03/07/2023       Trended Cardiac Data:  No results for input(s): TROPONINI, CKTOTAL, CKMB, BNP in the last 168 hours.      Radiology:  CT Head Without Contrast  START OF REPORT:  TECHNIQUE: CT OF THE HEAD WAS PERFORMED WITHOUT INTRAVENOUS CONTRAST WITH AXIAL AS WELL AS CORONAL AND SAGITTAL IMAGES.    COMPARISON: NONE.    DOSAGE INFORMATION: AUTOMATED EXPOSURE CONTROL WAS UTILIZED.    CLINICAL HISTORY: FALL (PT ARRIVES VIA [facility] AFTER HE STATES HE FELL ON MONDAY AND IS COMPLAINING OF LOWER EXTREMITY WEAKNESS AND BACK PAIN; PT ALSO STATES THAT HE HAS BEEN UNABLE TO WALK X1 YEAR AND LIVES ALONE.    Findings:  Hemorrhage: No acute intracranial hemorrhage is seen.  CSF spaces: The ventricles, sulci and basal cisterns all appear moderately prominent consistent with global cerebral atrophy.  Brain parenchyma: Pronounced microvascular change is seen in portions of the periventricular and deep white matter tracts.  Cerebellum: Unremarkable.  Sella and skull base: The sella appears to be within normal limits for age.  Intracranial calcifications: Incidental note is made of bilateral choroid plexus calcification. Incidental note is made of some pineal region  calcification.  Calvarium: No acute linear or depressed skull fracture is seen.    Maxillofacial Structures:  Paranasal sinuses: The visualized paranasal sinuses appear clear with no significant mucoperiosteal thickening or air fluid levels identified.  Orbits: The orbits appear unremarkable.  Zygomatic arches: The zygomatic arches are intact and unremarkable.  Temporal bones and mastoids: The temporal bones and mastoids appear unremarkable.  TMJ: The mandibular condyles appear normally placed with respect to the mandibular fossa.    Impression:  1. No acute intracranial process identified. Details and findings as noted above.  X-Ray Chest AP Portable  Narrative: EXAMINATION:  XR CHEST AP PORTABLE    CLINICAL HISTORY:  smoker;    TECHNIQUE:  One view    COMPARISON:  None available.    FINDINGS:  Cardiopericardial silhouette is within normal limits. Lungs are without dense focal or segmental consolidation, congestive process, pleural effusions or pneumothorax.  Impression: NO ACUTE CARDIOPULMONARY PROCESS IDENTIFIED.    Electronically signed by: Sharath Tello  Date:    03/07/2023  Time:    22:26            Assessment & Plan:     Active Problems  Bladder and bowel incontinence  Low back pain  Unilateral Weakness and inability to walk x1 year  Deconditioning  Hypertension  Nicotine use disorder  Chronic microvascular cerebral atrophy  Suspect patient is having silent strokes with significant decline in left-sided strength  Will obtain brain MRI in the a.m.  Will obtain CT lumbar spine  PT/OT/nutrition evaluation in the a.m.  P.r.n. DuoNeb and nicotine patch    CODE STATUS: Full Code  Access: Peripheral  Antibiotics: none  Diet: Regular  DVT Prophylaxis: Lovenox  GI Prophylaxis: none needed  Fluids:  None    Disposition: day 0 of admission for  unilateral weakness with deconditioning and failure to thrive    Abraham Moran MD - PGY2  [employer] [facility]

## 2023-03-08 NOTE — CONSULTS
"Inpatient Nutrition Evaluation    Admit Date: 3/7/2023   Total duration of encounter: 1 day    Nutrition Recommendation/Prescription     Pt with new CVA--per RN --pt able to eat breakfast without difficulty; ? Need ST swallow eval if swallow difficulty developes   Continue regular diet as tolerated  Will order chocolate boost tid--240kcal; 10 gm protein per serving  MVI/fe  Biweekly wt  Will monitor nutrition status     Nutrition Assessment     Chart Review    Reason Seen: continuous nutrition monitoring and physician consult for FTT    Malnutrition Screening Tool Results                Diagnosis:  Bladder/bowel incontinence, back pain, unilateral weakness, deconditioning, HTN, nicotine disorder, microvascular cerebral atrophy, acute CVA    Relevant Medical History: smoker     Nutrition-Related Medications: Kcl     Nutrition-Related Labs:  ((3-7) H/H 15.5/46.3 Gluc 95 Bun 5.8(L) Cr 0.6(L) K 3.0(L) Alb 3.2(L)     Diet Order: Diet Adult Regular  Oral Supplement Order: none  Appetite/Oral Intake: good/% of meals  Factors Affecting Nutritional Intake:  weakness   Food/Anabaptist/Cultural Preferences: none reported  Food Allergies: none reported       Wound(s):   none reported     Comments    (3/8) Received consult FTT; MRI report--+ acute CVA; pt with no past medical Hx except smoker. Pt admitted with weakness/? Inablility to walk--per H&P no walk for year; when I asked pt about mobility--he stated he could walk to truck to go shop at store for food; ? Some confusion with answer 2 new CVA. Nurse--reported pt ate 100% breakfast meal with out difficulty. ? Need ST swallow eval if any difficulty noted. Labs acknowledged. Will order oral supplement for added nutrition. Pt reported no wt loss .     Anthropometrics   5'7"       Last Weight: 76.2 kg (167 lb 14.4 oz) (23)     #  #  BMI 26.1   BMI Classification: overweight (BMI 25-29.9)          Usual Body Weight (UBW), k.2 kg  % Usual Body " Weight: 100.16     Usual Weight Provided By: patient    Wt Readings from Last 3 Encounters:   03/07/23 2153 76.2 kg (167 lb 14.4 oz)      Weight Change(s) Since Admission:  Admit Weight: 76.2 kg (167 lb 14.4 oz) (03/07/23 2153)  Pt stated UBW 76.2kg; ? Any wt change     Patient Education    Not applicable.    Monitoring & Evaluation     Dietitian will monitor food and beverage intake and weight.  Nutrition Risk/Follow-Up: low (follow-up in 5-7 days)  Patients assigned 'low nutrition risk' status do not qualify for a full nutritional assessment but will be monitored and re-evaluated in a 5-7 day time period. Please consult if re-evaluation needed sooner.

## 2023-03-08 NOTE — PT/OT/SLP PROGRESS
Missed Treatment Session - cancel note      Physical Therapy      Patient Name:  Geoffrey Smith   MRN:  [id number]    -patient not seen today secondary to Testing/imaging (MRI)  -24 hour bedrest till 2140-03/08/2023 pending MRI results  -will initiate therapy today if MRI negative

## 2023-03-08 NOTE — PLAN OF CARE
62-year-old male admitted on 03/07/2023 with weakness, frequent falls.  No significant past medical history.  On admission, significantly hypertensive with a blood pressure of 180/116.  CT scan of the head was normal.  Chest x-ray was normal.  There was a concern for an acute CVA.  MRI confirms acute CVA.  Severity of illness and intensity of services so far include:  Antiplatelet therapy, DVT prophylaxis, permissive hypertensive state, pt/ot        [X] Select Day, One:          [X] Episode Day 1, One:              [X] ACUTE, One:                  [X] Acute ischemic or hemorrhagic stroke and, All:                      [X] New onset neurological deficit, >= One:                          [X] Gait disturbance                      [X] Finding by CT or MRI, >= One:                          [X] Focal ischemia                      [X] Antiplatelet agent or anticoagulant (includes PO), administered or contraindicated     Dr. Tavarez agreed to ip loc on 3/8/23 at 10:55 AM CT.     Delia Luna MD  Utilization Management  Physician Advisor

## 2023-03-08 NOTE — PROGRESS NOTES
Pt not seen due to testing /imaging-MRI  24 hour bedrest till 2140-03/08/2023 pending MRI results  Will attempt eval today if MRI negative

## 2023-03-08 NOTE — HPI
Geoffrey Smith is a 62 y.o. male who has not seen a physician in many years and his only past medical history is nicotine use disorder. The patient presented to [facility] ED on 3/7/2023 with a primary complaint of frequent falls.  He states that he most recently fell on Monday however he denies any significant pain throughout.  He reports that he is had loss of bladder and bowel continence over the past year and has required depends for several months.  He states that he has not walked in the last year.  He was living on his own in a trailer prior to the past week where he was able to move in with his older brother.  His older brother felt as though he could not take care of him safely and had EMS picked him up to be hospitalized and seek nursing home care.  Patient states that he has no complaints at this time besides the lower extremity weakness and some mild back pain.

## 2023-03-08 NOTE — PLAN OF CARE
03/08/23 1108   Discharge Assessment   Assessment Type Discharge Planning Assessment   Confirmed/corrected address, phone number and insurance Yes   Confirmed Demographics Correct on Facesheet   Source of Information patient   Reason For Admission Ambulatory dysfunction   People in Home alone   Facility Arrived From: Home   Do you expect to return to your current living situation? Yes   Do you have help at home or someone to help you manage your care at home? No   Prior to hospitilization cognitive status: Alert/Oriented   Current cognitive status: Alert/Oriented   Equipment Currently Used at Home none   Readmission within 30 days? No   Patient currently being followed by outpatient case management? No   Do you currently have service(s) that help you manage your care at home? No   Do you take prescription medications? No   Do you have prescription coverage? No   Do you have any problems affording any of your prescribed medications? TBD   Who is going to help you get home at discharge? Family   How do you get to doctors appointments? car, drives self   Are you on dialysis? No   Discharge Plan A Skilled Nursing Facility   DME Needed Upon Discharge  other (see comments)  (Pending PT/OT eval)   Discharge Plan discussed with: Patient   Discharge Barriers Identified Underinsured;Other (see comments)  (Medicare Part A only)   Physical Activity   On average, how many days per week do you engage in moderate to strenuous exercise (like a brisk walk)? 0 days   On average, how many minutes do you engage in exercise at this level? 0 min   Financial Resource Strain   How hard is it for you to pay for the very basics like food, housing, medical care, and heating? Not very   Housing Stability   In the last 12 months, was there a time when you were not able to pay the mortgage or rent on time? N   In the last 12 months, how many places have you lived? 1   Transportation Needs   In the past 12 months, has lack of transportation  kept you from medical appointments or from getting medications? no   In the past 12 months, has lack of transportation kept you from meetings, work, or from getting things needed for daily living? No   Food Insecurity   Within the past 12 months, you worried that your food would run out before you got the money to buy more. Never true   Within the past 12 months, the food you bought just didn't last and you didn't have money to get more. Never true   Stress   Do you feel stress - tense, restless, nervous, or anxious, or unable to sleep at night because your mind is troubled all the time - these days? Not at all   Social Connections   In a typical week, how many times do you talk on the phone with family, friends, or neighbors? Twice a week   How often do you get together with friends or relatives? Never   How often do you attend Latter-day or Hoahaoism services?   (Pentecostalism)   Do you belong to any clubs or organizations such as Latter-day groups, unions, fraternal or athletic groups, or school groups? No   How often do you attend meetings of the clubs or organizations you belong to? Never   Are you , , , , never , or living with a partner?    Alcohol Use   Q1: How often do you have a drink containing alcohol? Patient refu     Pt reported he is ; lives alone; receives disability income; and has 1 dghtr, Breann Smith, in [place] (No contact provided). Pt stated he had moved in with his brother, Leon Smith ([phone]) for about a week prior to admission, but does not plan to return. Discussed consult for NH placement. Pt agreeable to consider PT/OT recommendations following eval.

## 2023-03-08 NOTE — SUBJECTIVE & OBJECTIVE
Interval History: Patient did well overnight with NAEON. He tolerated his breakfast well this morning without vomiting. He is receptive to speaking with CM about possibly SNF or NH placement. He endorses feeling symmetric weakness in his arms and legs with no loss of sensation. He did not have any incontinence overnight.     Review of Systems   Constitutional:  Negative for appetite change.   HENT:  Negative for trouble swallowing.    Eyes:  Negative for visual disturbance (denies acute vision changes).   Respiratory:  Negative for shortness of breath and wheezing.    Cardiovascular:  Negative for chest pain and leg swelling.   Gastrointestinal:  Negative for abdominal pain, blood in stool, constipation, diarrhea, nausea and vomiting.   Genitourinary:  Negative for decreased urine volume, dysuria, flank pain and hematuria.        Endorses urinary incontinence    Musculoskeletal:  Positive for back pain (chronic mid to lower back pain).   Skin:  Negative for rash and wound.   Neurological:  Positive for weakness. Negative for tremors, seizures, facial asymmetry, speech difficulty, numbness and headaches.        Endorses increased falls at home due to muscle and leg weakness.   Psychiatric/Behavioral:  Negative for sleep disturbance.    Objective:     Vital Signs (Most Recent):  Temp: 98.6 °F (37 °C) (03/08/23 0800)  Pulse: 60 (03/08/23 0500)  Resp: 12 (03/08/23 0500)  BP: (!) 138/118 (03/08/23 0800)  SpO2: 96 % (03/08/23 0815)   Vital Signs (24h Range):  Temp:  [97.7 °F (36.5 °C)-98.6 °F (37 °C)] 98.6 °F (37 °C)  Pulse:  [60-89] 60  Resp:  [10-25] 12  SpO2:  [93 %-98 %] 96 %  BP: (121-180)/() 138/118     Weight: 76.2 kg (167 lb 14.4 oz)  There is no height or weight on file to calculate BMI.    Intake/Output Summary (Last 24 hours) at 3/8/2023 1119  Last data filed at 3/7/2023 2353  Gross per 24 hour   Intake 999 ml   Output --   Net 999 ml      Physical Exam  Constitutional:       General: He is not in acute  distress.     Comments: Elderly appearing white male lying in bed. Appears weak and speaks/answers questions slowly. (Unsure of his baselines)   HENT:      Right Ear: External ear normal.      Left Ear: External ear normal.      Mouth/Throat:      Mouth: Mucous membranes are moist.      Comments: No teeth present  Eyes:      General: No scleral icterus.     Extraocular Movements: Extraocular movements intact.      Pupils: Pupils are equal, round, and reactive to light.   Cardiovascular:      Rate and Rhythm: Normal rate and regular rhythm.      Pulses: Normal pulses.      Heart sounds: Normal heart sounds. No murmur heard.  Pulmonary:      Effort: Pulmonary effort is normal.      Breath sounds: Normal breath sounds. No wheezing, rhonchi or rales.   Abdominal:      General: Bowel sounds are normal. There is no distension.      Palpations: Abdomen is soft. There is no mass.      Tenderness: There is abdominal tenderness (suprapubic tenderness). There is no right CVA tenderness or left CVA tenderness.   Musculoskeletal:         General: No swelling.      Cervical back: Neck supple. No tenderness.      Right lower leg: No edema.      Left lower leg: No edema.   Lymphadenopathy:      Cervical: No cervical adenopathy.   Skin:     General: Skin is warm and dry.      Capillary Refill: Capillary refill takes less than 2 seconds.      Coloration: Skin is not jaundiced.      Comments: B/L nontender calves, equal in size.   Neurological:      Mental Status: He is alert and oriented to person, place, and time.       Significant Labs: All pertinent labs within the past 24 hours have been reviewed.  Recent Lab Results         03/08/23  0609   03/08/23  0605   03/07/23  2253   03/07/23  2204        Albumin/Globulin Ratio       1.1       Albumin       3.2       Alkaline Phosphatase       102       ALT       25       Appearance, UA     Turbid         AST       19       Bacteria, UA     None Seen         Baso #       0.05        Basophil %       0.5       BILIRUBIN TOTAL       0.9       Bilirubin, UA     Negative         Budding Yeast, UA     Occ         BUN       5.8       Calcium       8.8       Chloride       105       CO2       25       Color, UA     Light-Yellow         CPK       49       Creatinine       0.69       CRP 36.40             eGFR       >60       Eos #       0.23       Eosinophil %       2.4       Globulin, Total       2.9       Glucose       95       Glucose, UA     Normal         Hematocrit       46.3       Hemoglobin       15.5       Hyaline Casts, UA     None Seen         Immature Grans (Abs)       0.04       Immature Granulocytes       0.4       Ketones, UA     Negative         Leukocytes, UA     Negative         Lymph #       1.78       LYMPH %       18.3       Magnesium   2.10     2.10       MCH       29.6       MCHC       33.5       MCV       88.4       Mono #       0.82       Mono %       8.4       MPV       11.2       Mucous, UA     Trace         Neut #       6.81       Neut %       70.0       NITRITE UA     Negative         nRBC       0.0       Occult Blood UA     Negative         pH, UA     8.0         Phosphorus   3.3           Platelets       200       Potassium       3.0       PROTEIN TOTAL       6.1       Protein, UA     Negative         RBC       5.24       RBC, UA     0-5         RDW       13.3       Sodium       140       Specific Gravity,UA     1.010         Squamous Epithelial Cells, UA     None Seen         Thyroid Stimulating Hormone       1.587       Unclassified Crystal, UA     Occ         Urobilinogen, UA     2+         WBC, UA     0-5         WBC       9.7               Significant Imaging:   Narrative & Impression  EXAMINATION:  MRI BRAIN W WO CONTRAST     CLINICAL HISTORY:  Neuro deficit, persistent/recurrent, CNS neoplasm suspected;     TECHNIQUE:  Multiplanar, multisequence MR images of the brain were obtained with and without administration of intravenous contrast.     COMPARISON:  CT head  dated 03/07/2023     FINDINGS:  There is a small focus of restricted diffusion in the right internal capsule.  There is no evidence of hemorrhagic transformation.  There are moderate patchy T2/FLAIR hyperintensities in the subcortical and periventricular white matter and randal, with multiple prior lacunar infarcts in the basal ganglia.  There is no abnormal parenchymal or leptomeningeal enhancement.     There is no mass effect or midline shift.  The basal cisterns are patent.  There is moderate diffuse parenchymal volume loss.  There is no hydrocephalus or abnormal extra-axial fluid collection.  The major intracranial flow voids are patent.  There is trace scattered paranasal sinus mucosal thickening.  Left mastoid effusion is noted.     Impression:     1. Acute lacunar infarct in the right internal capsule without hemorrhage.  2. Moderate chronic microvascular ischemic changes.        Electronically signed by: Beth Small  Date:                                            03/08/2023  Time:                                           09:50           Exam Ended: 03/08/23 09:28 Last Resulted: 03/08/23 09:50

## 2023-03-09 LAB
ALBUMIN SERPL-MCNC: 3.1 G/DL (ref 3.4–4.8)
ALBUMIN/GLOB SERPL: 1.1 RATIO (ref 1.1–2)
ALP SERPL-CCNC: 101 UNIT/L (ref 40–150)
ALT SERPL-CCNC: 22 UNIT/L (ref 0–55)
AST SERPL-CCNC: 18 UNIT/L (ref 5–34)
BASOPHILS # BLD AUTO: 0.06 X10(3)/MCL (ref 0–0.2)
BASOPHILS NFR BLD AUTO: 0.8 %
BILIRUBIN DIRECT+TOT PNL SERPL-MCNC: 0.7 MG/DL
BUN SERPL-MCNC: 8.4 MG/DL (ref 8.4–25.7)
CALCIUM SERPL-MCNC: 8.8 MG/DL (ref 8.8–10)
CHLORIDE SERPL-SCNC: 105 MMOL/L (ref 98–107)
CO2 SERPL-SCNC: 22 MMOL/L (ref 23–31)
CREAT SERPL-MCNC: 0.69 MG/DL (ref 0.73–1.18)
EOSINOPHIL # BLD AUTO: 0.32 X10(3)/MCL (ref 0–0.9)
EOSINOPHIL NFR BLD AUTO: 4.1 %
ERYTHROCYTE [DISTWIDTH] IN BLOOD BY AUTOMATED COUNT: 13.3 % (ref 11.5–17)
GFR SERPLBLD CREATININE-BSD FMLA CKD-EPI: >60 MLS/MIN/1.73/M2
GLOBULIN SER-MCNC: 2.9 GM/DL (ref 2.4–3.5)
GLUCOSE SERPL-MCNC: 96 MG/DL (ref 82–115)
HCT VFR BLD AUTO: 44 % (ref 42–52)
HGB BLD-MCNC: 14.6 G/DL (ref 14–18)
IMM GRANULOCYTES # BLD AUTO: 0.04 X10(3)/MCL (ref 0–0.04)
IMM GRANULOCYTES NFR BLD AUTO: 0.5 %
LYMPHOCYTES # BLD AUTO: 1.8 X10(3)/MCL (ref 0.6–4.6)
LYMPHOCYTES NFR BLD AUTO: 22.8 %
MAGNESIUM SERPL-MCNC: 2.1 MG/DL (ref 1.6–2.6)
MCH RBC QN AUTO: 29.1 PG
MCHC RBC AUTO-ENTMCNC: 33.2 G/DL (ref 33–36)
MCV RBC AUTO: 87.6 FL (ref 80–94)
MONOCYTES # BLD AUTO: 0.78 X10(3)/MCL (ref 0.1–1.3)
MONOCYTES NFR BLD AUTO: 9.9 %
NEUTROPHILS # BLD AUTO: 4.9 X10(3)/MCL (ref 2.1–9.2)
NEUTROPHILS NFR BLD AUTO: 61.9 %
NRBC BLD AUTO-RTO: 0 %
PHOSPHATE SERPL-MCNC: 2.4 MG/DL (ref 2.3–4.7)
PLATELET # BLD AUTO: 207 X10(3)/MCL (ref 130–400)
PMV BLD AUTO: 11.1 FL (ref 7.4–10.4)
POTASSIUM SERPL-SCNC: 3.8 MMOL/L (ref 3.5–5.1)
PROT SERPL-MCNC: 6 GM/DL (ref 5.8–7.6)
RBC # BLD AUTO: 5.02 X10(6)/MCL (ref 4.7–6.1)
SODIUM SERPL-SCNC: 136 MMOL/L (ref 136–145)
WBC # SPEC AUTO: 7.9 X10(3)/MCL (ref 4.5–11.5)

## 2023-03-09 PROCEDURE — 94761 N-INVAS EAR/PLS OXIMETRY MLT: CPT

## 2023-03-09 PROCEDURE — 80053 COMPREHEN METABOLIC PANEL: CPT | Performed by: FAMILY MEDICINE

## 2023-03-09 PROCEDURE — 83735 ASSAY OF MAGNESIUM: CPT | Performed by: FAMILY MEDICINE

## 2023-03-09 PROCEDURE — 21400001 HC TELEMETRY ROOM

## 2023-03-09 PROCEDURE — 84100 ASSAY OF PHOSPHORUS: CPT | Performed by: FAMILY MEDICINE

## 2023-03-09 PROCEDURE — 99900035 HC TECH TIME PER 15 MIN (STAT)

## 2023-03-09 PROCEDURE — 85025 COMPLETE CBC W/AUTO DIFF WBC: CPT | Performed by: FAMILY MEDICINE

## 2023-03-09 PROCEDURE — 97167 OT EVAL HIGH COMPLEX 60 MIN: CPT

## 2023-03-09 PROCEDURE — 97163 PT EVAL HIGH COMPLEX 45 MIN: CPT

## 2023-03-09 PROCEDURE — 25000003 PHARM REV CODE 250: Performed by: STUDENT IN AN ORGANIZED HEALTH CARE EDUCATION/TRAINING PROGRAM

## 2023-03-09 PROCEDURE — 63600175 PHARM REV CODE 636 W HCPCS: Performed by: FAMILY MEDICINE

## 2023-03-09 RX ORDER — ALBUTEROL SULFATE 0.83 MG/ML
2.5 SOLUTION RESPIRATORY (INHALATION) EVERY 6 HOURS PRN
Status: DISCONTINUED | OUTPATIENT
Start: 2023-03-09 | End: 2023-03-15 | Stop reason: HOSPADM

## 2023-03-09 RX ORDER — LOSARTAN POTASSIUM 25 MG/1
25 TABLET ORAL DAILY
Status: DISCONTINUED | OUTPATIENT
Start: 2023-03-09 | End: 2023-03-15 | Stop reason: HOSPADM

## 2023-03-09 RX ADMIN — CLOPIDOGREL BISULFATE 75 MG: 75 TABLET, FILM COATED ORAL at 09:03

## 2023-03-09 RX ADMIN — ENOXAPARIN SODIUM 40 MG: 40 INJECTION SUBCUTANEOUS at 06:03

## 2023-03-09 RX ADMIN — ASPIRIN 81 MG: 81 TABLET, CHEWABLE ORAL at 09:03

## 2023-03-09 RX ADMIN — ATORVASTATIN CALCIUM 80 MG: 40 TABLET, FILM COATED ORAL at 08:03

## 2023-03-09 RX ADMIN — LOSARTAN POTASSIUM 25 MG: 25 TABLET, FILM COATED ORAL at 09:03

## 2023-03-09 NOTE — PROGRESS NOTES
[facility] [place] [facility]  Progress Note    Patient Name: Geoffrey Smith  MRN: [id number]  Patient Class: IP- Inpatient   Admission Date: 3/7/2023  Length of Stay: 1 days  Attending Physician: Jagjit Tavarez MD  Primary Care Provider: Primary Doctor No        Subjective:     Principal Problem:Lacunar stroke, acute        HPI:  Geoffrey Smith is a 62 y.o. male who has not seen a physician in many years and his only past medical history is nicotine use disorder. The patient presented to [facility] ED on 3/7/2023 with a primary complaint of frequent falls.  He states that he most recently fell on Monday however he denies any significant pain throughout.  He reports that he is had loss of bladder and bowel continence over the past year and has required depends for several months.  He states that he has not walked in the last year.  He was living on his own in a trailer prior to the past week where he was able to move in with his older brother.  His older brother felt as though he could not take care of him safely and had EMS picked him up to be hospitalized and seek nursing home care.  Patient states that he has no complaints at this time besides the lower extremity weakness and some mild back pain.      Overview/Hospital Course:  No notes on file    Interval History: Patient's mentation remains the same from previous exam. He is alert and oriented, but slow to answer questions. He has NAEON. He states his suprapubic tenderness has improved significantly. Per nurse, patient wet his bed twice and had an external catheter in place yesterday evening which contained urine. H eis now wearing an adult diaper. Patient has been tolerating a diet well and has no complaints this AM.    ROS as noted above.   Objective:     Vital Signs (Most Recent):  Temp: 97.6 °F (36.4 °C) (03/09/23 0817)  Pulse: 83 (03/09/23 0817)  Resp: 20 (03/09/23 0817)  BP: (!) 157/103 (03/09/23 0817)  SpO2: 98 % (03/09/23  0817)   Vital Signs (24h Range):  Temp:  [97.6 °F (36.4 °C)-98.3 °F (36.8 °C)] 97.6 °F (36.4 °C)  Pulse:  [69-98] 83  Resp:  [16-20] 20  SpO2:  [95 %-98 %] 98 %  BP: (138-192)/() 157/103     Weight: 75.8 kg (167 lb)  There is no height or weight on file to calculate BMI.    Intake/Output Summary (Last 24 hours) at 3/9/2023 0843  Last data filed at 3/8/2023 1548  Gross per 24 hour   Intake --   Output 100 ml   Net -100 ml          Physical Exam  Constitutional:       General: He is not in acute distress.     Comments: Elderly appearing white male lying in bed. Appears weak and continues to speak/answer questions slowly.  HENT:      Right Ear: External ear normal.      Left Ear: External ear normal.      Mouth/Throat:      Mouth: Mucous membranes are moist.      Comments: No teeth present  Eyes:      General: No scleral icterus.     Extraocular Movements: Extraocular movements intact.      Pupils: Pupils are equal, round, and reactive to light.   Cardiovascular:      Rate and Rhythm: Normal rate and regular rhythm.      Pulses: Normal pulses.      Heart sounds: Normal heart sounds. No murmur heard.  Pulmonary:      Effort: Pulmonary effort is normal.      Breath sounds: Normal breath sounds. No wheezing, rhonchi or rales.   Abdominal:      General: Bowel sounds are normal. There is no distension.      Palpations: Abdomen is soft. There is no mass.      Tenderness: There is no abdominal tenderness or suprapubic tendernes.  There is no right CVA tenderness or left CVA tenderness.   Musculoskeletal:         General: No swelling.      Cervical back: Neck supple. No tenderness.      Right lower leg: No edema.      Left lower leg: No edema.   Lymphadenopathy:      Cervical: No cervical adenopathy.   Skin:     General: Skin is warm and dry.      Capillary Refill: Capillary refill takes less than 2 seconds.      Coloration: Skin is not jaundiced.      Comments: B/L nontender calves, equal in size.   Neurological:       Mental Status: He is alert and oriented to person, place, and time. 2+ DTR knee B/L; Babinski reflexes neg B/L.  4/5 strength in LLE; 5/5 RLE strength   5/5 UE strength B/L; strength improving overall in left upper and lower extremities.        Significant Labs: All pertinent labs within the past 24 hours have been reviewed.  Recent Lab Results         03/09/23  0412   03/08/23  1143        Albumin/Globulin Ratio 1.1         Albumin 3.1         Alkaline Phosphatase 101         ALT 22         Ao root annulus   3.50       Ao peak major   1.05       Ao VTI   23.7       AST 18         AV valve area   2.71       AV mean gradient   2       AV index (prosthetic)   0.88       AV peak gradient   4       AV Velocity Ratio   0.91       Baso # 0.06         Basophil % 0.8         BILIRUBIN TOTAL 0.7         BUN 8.4         Calcium 8.8         Chloride 105         CO2 22         Creatinine 0.69         E/A ratio   0.82       E/E' ratio   4.48       eGFR >60         EF   60       Eos # 0.32         Eosinophil % 4.1         E wave deceleration time   130.91       Globulin, Total 2.9         Glucose 96         Hematocrit 44.0         Hemoglobin 14.6         Mitral Valve Heart Rate   68       Immature Grans (Abs) 0.04         Immature Granulocytes 0.5         LA size   3.58       LA volume   30.00       LVOT area   3.1       LV LATERAL E/E' RATIO   3.62       LV SEPTAL E/E' RATIO   5.88       LV EDV BP   75.00       Left Ventricular Outflow Tract Mean Gradient   1.96       Left Ventricular Outflow Tract Mean Velocity   0.66       LVOT diameter   1.98       LVOT peak major   0.96       LVOT stroke volume   64.32       LVOT peak VTI   20.90       LV ESV BP   33.00       Lymph # 1.80         LYMPH % 22.8         Magnesium 2.10         MCH 29.1         MCHC 33.2         MCV 87.6         Mean e'   0.11       Mono # 0.78         Mono % 9.9         MPV 11.1         MV valve area by continuity eq   2.88       MV mean gradient   1       MV peak  gradient   3       MV Peak A Junior   0.57       MV Peak E Junior   0.47       MV VTI   22.3       Neut # 4.90         Neut % 61.9         nRBC 0.0         Phosphorus 2.4         Platelets 207         Potassium 3.8         PROTEIN TOTAL 6.0         Right Atrial Pressure (from IVC)   3       RA Width   4.40       RBC 5.02         RDW 13.3         Sodium 136         TAPSE   1.72       TDI SEPTAL   0.08       TDI LATERAL   0.13       WBC 7.9                 Significant Imaging: I have reviewed all pertinent imaging results/findings within the past 24 hours.    Narrative & Impression  EXAMINATION:  MRI BRAIN W WO CONTRAST     CLINICAL HISTORY:  Neuro deficit, persistent/recurrent, CNS neoplasm suspected;     TECHNIQUE:  Multiplanar, multisequence MR images of the brain were obtained with and without administration of intravenous contrast.     COMPARISON:  CT head dated 03/07/2023     FINDINGS:  There is a small focus of restricted diffusion in the right internal capsule.  There is no evidence of hemorrhagic transformation.  There are moderate patchy T2/FLAIR hyperintensities in the subcortical and periventricular white matter and randal, with multiple prior lacunar infarcts in the basal ganglia.  There is no abnormal parenchymal or leptomeningeal enhancement.     There is no mass effect or midline shift.  The basal cisterns are patent.  There is moderate diffuse parenchymal volume loss.  There is no hydrocephalus or abnormal extra-axial fluid collection.  The major intracranial flow voids are patent.  There is trace scattered paranasal sinus mucosal thickening.  Left mastoid effusion is noted.     Impression:     1. Acute lacunar infarct in the right internal capsule without hemorrhage.  2. Moderate chronic microvascular ischemic changes.        Electronically signed by: Beth Small  Date:                                            03/08/2023  Time:                                           09:50           Exam Ended:  03/08/23 09:28 Last Resulted: 03/08/23 09:50             Assessment/Plan:      Lacunar Infarct (Right sided)  Unilateral Left sided weakness  -NIH stroke score of 5(moderate stroke)  -MRI of brain showed an acute lacunar infarct in the right internal capsule without hemorrhage and chronic microvascular ischemic changes.  -CT of head showed vascular calcifications and prominent patchy hypo attenuation in the cerebral white matter likely associated wit chronic small vessel ischemic changes; few scattered lacunar infarcts  -CT lumbar spine no large disc herniation and mild neural foraminal narrowing bilaterally at L4-L5  -CXR clear without acute cardiopulmonary process identified  -Starting ASA loading dose, Plavix 75 mg daily, a high intensity statin. He will continue the ASA 81mg daily after loading.   -UDS negative  - Working with PT/OT who recommend rehab placement      Hypokalemia   -Replaced with PO Kcl   -Replete as needed               CODE STATUS: Full Code  Access: Peripheral  Antibiotics: none  Diet: Regular  GI Prophylaxis: none needed  Fluids:  None  DVT ppx: Lovenox 40 daily           Disposition: Day 3 of admission for unilateral left sided weakness with deconditioning and failure to thrive. Discharge pending rehab placement. CM aware.           Ninfa Xiao DO  Department of Hospital Medicine   [facility] Telemetry

## 2023-03-09 NOTE — PLAN OF CARE
New consult for rehab. Patient is in agreement with no preference. Referral sent to [facility] via [facility]. Will follow.

## 2023-03-09 NOTE — SUBJECTIVE & OBJECTIVE
Interval History: Patient's mentation remains the same from previous exam. He is alert and oriented, but slow to answer questions. He has NAEON. Per nurse, patient wet his bed twice and had an external catheter in place yesterday evening which contained urine. H eis now wearing an adult diaper. Patient has been tolerating a diet well and has no complaints this AM.    ROS as noted above.   Objective:     Vital Signs (Most Recent):  Temp: 97.6 °F (36.4 °C) (03/09/23 0817)  Pulse: 83 (03/09/23 0817)  Resp: 20 (03/09/23 0817)  BP: (!) 157/103 (03/09/23 0817)  SpO2: 98 % (03/09/23 0817)   Vital Signs (24h Range):  Temp:  [97.6 °F (36.4 °C)-98.3 °F (36.8 °C)] 97.6 °F (36.4 °C)  Pulse:  [69-98] 83  Resp:  [16-20] 20  SpO2:  [95 %-98 %] 98 %  BP: (138-192)/() 157/103     Weight: 75.8 kg (167 lb)  There is no height or weight on file to calculate BMI.    Intake/Output Summary (Last 24 hours) at 3/9/2023 0843  Last data filed at 3/8/2023 1548  Gross per 24 hour   Intake --   Output 100 ml   Net -100 ml          Physical Exam  Constitutional:       General: He is not in acute distress.     Comments: Elderly appearing white male lying in bed. Appears weak and speaks/answers questions slowly. (Unsure of his baselines)   HENT:      Right Ear: External ear normal.      Left Ear: External ear normal.      Mouth/Throat:      Mouth: Mucous membranes are moist.      Comments: No teeth present  Eyes:      General: No scleral icterus.     Extraocular Movements: Extraocular movements intact.      Pupils: Pupils are equal, round, and reactive to light.   Cardiovascular:      Rate and Rhythm: Normal rate and regular rhythm.      Pulses: Normal pulses.      Heart sounds: Normal heart sounds. No murmur heard.  Pulmonary:      Effort: Pulmonary effort is normal.      Breath sounds: Normal breath sounds. No wheezing, rhonchi or rales.   Abdominal:      General: Bowel sounds are normal. There is no distension.      Palpations: Abdomen is  soft. There is no mass.      Tenderness: There is abdominal tenderness (suprapubic tenderness). There is no right CVA tenderness or left CVA tenderness.   Musculoskeletal:         General: No swelling.      Cervical back: Neck supple. No tenderness.      Right lower leg: No edema.      Left lower leg: No edema.   Lymphadenopathy:      Cervical: No cervical adenopathy.   Skin:     General: Skin is warm and dry.      Capillary Refill: Capillary refill takes less than 2 seconds.      Coloration: Skin is not jaundiced.      Comments: B/L nontender calves, equal in size.   Neurological:      Mental Status: He is alert and oriented to person, place, and time. 2+ DTR knee B/L; Babinski reflexes neg B/L.  3/5 strength in LLE; 5/5 RLE strength   5/5 UE strength B/L        Significant Labs: All pertinent labs within the past 24 hours have been reviewed.  Recent Lab Results         03/09/23  0412   03/08/23  1143        Albumin/Globulin Ratio 1.1         Albumin 3.1         Alkaline Phosphatase 101         ALT 22         Ao root annulus   3.50       Ao peak major   1.05       Ao VTI   23.7       AST 18         AV valve area   2.71       AV mean gradient   2       AV index (prosthetic)   0.88       AV peak gradient   4       AV Velocity Ratio   0.91       Baso # 0.06         Basophil % 0.8         BILIRUBIN TOTAL 0.7         BUN 8.4         Calcium 8.8         Chloride 105         CO2 22         Creatinine 0.69         E/A ratio   0.82       E/E' ratio   4.48       eGFR >60         EF   60       Eos # 0.32         Eosinophil % 4.1         E wave deceleration time   130.91       Globulin, Total 2.9         Glucose 96         Hematocrit 44.0         Hemoglobin 14.6         Mitral Valve Heart Rate   68       Immature Grans (Abs) 0.04         Immature Granulocytes 0.5         LA size   3.58       LA volume   30.00       LVOT area   3.1       LV LATERAL E/E' RATIO   3.62       LV SEPTAL E/E' RATIO   5.88       LV EDV BP   75.00        Left Ventricular Outflow Tract Mean Gradient   1.96       Left Ventricular Outflow Tract Mean Velocity   0.66       LVOT diameter   1.98       LVOT peak junior   0.96       LVOT stroke volume   64.32       LVOT peak VTI   20.90       LV ESV BP   33.00       Lymph # 1.80         LYMPH % 22.8         Magnesium 2.10         MCH 29.1         MCHC 33.2         MCV 87.6         Mean e'   0.11       Mono # 0.78         Mono % 9.9         MPV 11.1         MV valve area by continuity eq   2.88       MV mean gradient   1       MV peak gradient   3       MV Peak A Junior   0.57       MV Peak E Junior   0.47       MV VTI   22.3       Neut # 4.90         Neut % 61.9         nRBC 0.0         Phosphorus 2.4         Platelets 207         Potassium 3.8         PROTEIN TOTAL 6.0         Right Atrial Pressure (from IVC)   3       RA Width   4.40       RBC 5.02         RDW 13.3         Sodium 136         TAPSE   1.72       TDI SEPTAL   0.08       TDI LATERAL   0.13       WBC 7.9                 Significant Imaging: I have reviewed all pertinent imaging results/findings within the past 24 hours.    Narrative & Impression  EXAMINATION:  MRI BRAIN W WO CONTRAST     CLINICAL HISTORY:  Neuro deficit, persistent/recurrent, CNS neoplasm suspected;     TECHNIQUE:  Multiplanar, multisequence MR images of the brain were obtained with and without administration of intravenous contrast.     COMPARISON:  CT head dated 03/07/2023     FINDINGS:  There is a small focus of restricted diffusion in the right internal capsule.  There is no evidence of hemorrhagic transformation.  There are moderate patchy T2/FLAIR hyperintensities in the subcortical and periventricular white matter and randal, with multiple prior lacunar infarcts in the basal ganglia.  There is no abnormal parenchymal or leptomeningeal enhancement.     There is no mass effect or midline shift.  The basal cisterns are patent.  There is moderate diffuse parenchymal volume loss.  There is no  hydrocephalus or abnormal extra-axial fluid collection.  The major intracranial flow voids are patent.  There is trace scattered paranasal sinus mucosal thickening.  Left mastoid effusion is noted.     Impression:     1. Acute lacunar infarct in the right internal capsule without hemorrhage.  2. Moderate chronic microvascular ischemic changes.        Electronically signed by: Beth Small  Date:                                            03/08/2023  Time:                                           09:50           Exam Ended: 03/08/23 09:28 Last Resulted: 03/08/23 09:50

## 2023-03-09 NOTE — PLAN OF CARE
Problem: Occupational Therapy  Goal: Occupational Therapy Goal  Description: Pt will sit EOB with SBA for balance, maintaining midline orientation with verbal cues alone for 15 min.    Patient will perform grooming with mod assist while standing at sink.    Patient will perform toileting with mod assist using BSC.    Patient will perform toilet transfer with min assist with use of RW.    Patient will perform upper body dressing with mod assist while seated EOB.         Outcome: Ongoing, Progressing

## 2023-03-09 NOTE — PT/OT/SLP EVAL
Occupational Therapy   Evaluation    Name: Geoffrey Smith  MRN: [id number]  Admitting Diagnosis: Lacunar stroke, acute  Recent Surgery: * No surgery found *      Recommendations:     Discharge Recommendations: rehabilitation facility  Discharge Equipment Recommendations:  bedside commode, walker, rolling, wheelchair  Barriers to discharge:  Inaccessible home environment, Decreased caregiver support    Assessment:     Geoffrey Smith is a 62 y.o. male with a medical diagnosis of Lacunar stroke, acute.  He presents with L sided weakness at both UE and LE, L lateral lean in sitting EOB requiring mod A to maintain sitting balance in midline but able to follow cues to help correct LoB.  Responses and processing were delayed and pt only oriented to self and place, able to provide basic history and PLOF/living situation with extra time to complete verbal responses.  Pt would benefit from ST eval to address cognitive deficits and to r/o swallowing/dysarthria issues.  Pt followed all commands and was motivated to participate in activities seated EOB and standing at bedside (BP quite elevated as noted below limiting evaluation to standing bedside, pt returned to bed with HOB elevated to eat breakfast, nurse aware). Performance deficits affecting function: weakness, impaired endurance, impaired sensation, impaired self care skills, impaired functional mobility, impaired balance, impaired cognition, decreased upper extremity function, decreased lower extremity function, decreased safety awareness, decreased ROM, impaired coordination, impaired fine motor, impaired cardiopulmonary response to activity.      Rehab Prognosis: Good; patient would benefit from acute skilled OT services to address these deficits and reach maximum level of function.       Plan:     Patient to be seen 2 x/week, 5 x/week (M-F) to address the above listed problems via therapeutic exercises, therapeutic activities, self-care/home management, neuromuscular  re-education  Plan of Care Expires:    Plan of Care Reviewed with: patient    Subjective     Chief Complaint: weakness  Patient/Family Comments/goals: pt agreeable to DC to rehab with ultimate goal to regain maximum functional independence and if possible return home independently    Occupational Profile:  Living Environment: pt lives alone in a camper with 3 steps to enter, no handrails, walk-in tub/shower combo  Previous level of function: independent with all ADLs and mobility  Roles and Routines: driving, cooking, cleaning  Equipment Used at Home: none  Assistance upon Discharge: pt stated family support is unavailable and he is open to DC to rehab or SNF to continue therapy goals    Pain/Comfort:  Pain Rating 1: 0/10    Patients cultural, spiritual, Cheondoism conflicts given the current situation: no    Objective:     Communicated with: nurse Small prior to session.  Patient found HOB elevated with peripheral IV upon OT entry to room.    General Precautions: Standard, fall  Orthopedic Precautions:    Braces:    Respiratory Status: Room air  Initial vital signs:  /119, HR 92, O2 sat 97%  After standing from EOB:  /111, HR 82, O2 sat 96%  End of session:  157/103, HR 86, O2 sat 96% (after return to bed with HOB elevated)    Occupational Performance:    Bed Mobility:    Patient completed Rolling/Turning to Left with  moderate assistance  Patient completed Scooting/Bridging with minimum assistance  Patient completed Supine to Sit with moderate assistance  Patient completed Sit to Supine with minimum assistance    Functional Mobility/Transfers:  Patient completed Sit <> Stand Transfer with minimum assistance and of 2 persons  with  rolling walker   Functional Mobility: mod A x2 using RW to sidestep to HOB with multiple cues required to maintain midline, to shift onto RLE and slide/step with LLE with max cuing and touching assist at LLE, mod assist to maintain weightbearing through LUE and verbal cues  for sequencing and to direct attention to LUE and LLE gross motor coordination    Activities of Daily Living:  Feeding:  stand by assistance pt able to feed himself using alternating UE in sitting with HOB elevated with setup of all items to open containers and cut food into bite sized pieces, no coughing or choking noted on bite sized foods or liquids, ST notified  Upper Body Dressing: maximal assistance seated EOB d/t unable to maintain sitting balance without BUE support  Lower Body Dressing: maximal assistance seated EOB  Toileting: dependence with incontinent bowel episode noted at eval; pt able to maintain standing at RW with mod A during clothing management and hygiene in standing with fair endurance    Cognitive/Visual Perceptual:  Cognitive/Psychosocial Skills:     -       Oriented to: Person and Place   -       Follows Commands/attention:Easily distracted and Follows one-step commands  -       Safety awareness/insight to disability: impaired     Physical Exam:  Balance:    -       static sitting balance EOB max A initially but improved with cuing and balance training to mod A, inconsistently able to follow cues to self-correct LOB to left side and posteriorly, dynamic sitting balance max A (unable to shift weight or participate in activities without LOB)  Skin integrity: Visible skin intact  Edema:  None noted  Sensation:    -       Impaired  sharp/dull . and stereognosis .  Motor Planning:    -       significantly delayed  Dominant hand:    -       left  Upper Extremity Range of Motion:     -       Right Upper Extremity: WNL  -       Left Upper Extremity: Deficits: shoulder AAROM WFL, AROM limited by weakness to roughly 80 degrees flexion during reaching while sitting EOB; pt able to bring hand to mouth unassisted and to demonstrated L wrist/hand AROM WNL  Upper Extremity Strength:    -       Right Upper Extremity: WFL  -       Left Upper Extremity: Deficits: 3-/5 at shoulder, 3/5 all joints distal  Gross  motor coordination:   ataxia hand-eye coordination    AMPAC 6 Click ADL:  AMPAC Total Score:      Treatment & Education:  Pt educated on OT goals and POC, initial DC recommendations for rehab or SNF d/t anticipated need for significant duration of therapy to return to PLOF, LUE positioning when sitting with HOB elevated, orientation to place and general effects of CVA along with anticipated therapeutic interventions, and use of call bell for assist with any needs OoB d/t high fall risk.  Pt verbalized understanding, will need reinforcement.    Patient left HOB elevated with all lines intact, call button in reach, and nurse notified    GOALS:   Multidisciplinary Problems       Occupational Therapy Goals       Not on file                    History:     History reviewed. No pertinent past medical history.    No past surgical history on file.    Time Tracking:     OT Date of Treatment: 03/09/23  OT Start Time: 0752  OT Stop Time: 0829  OT Total Time (min): 37 min    Billable Minutes:Evaluation 37    3/9/2023

## 2023-03-09 NOTE — PLAN OF CARE
Problem: Skin Injury Risk Increased  Goal: Skin Health and Integrity  Outcome: Ongoing, Progressing     Problem: Fall Injury Risk  Goal: Absence of Fall and Fall-Related Injury  Outcome: Ongoing, Progressing     Problem: Infection  Goal: Absence of Infection Signs and Symptoms  Outcome: Ongoing, Progressing

## 2023-03-09 NOTE — PT/OT/SLP EVAL
Physical Therapy Evaluation    Patient Name:  Geoffrey Smith   MRN:  [id number]    Recommendations:     Discharge Recommendations:  rehabilitation facility   Discharge Equipment Recommendations: walker, rolling, bedside commode, wheelchair   Equipment to be obtained for discharge:  walker, rolling, bedside commode, wheelchair   Barriers to discharge: Severity of deficits, Decreased caregiver support, Cognitive Status, and Level of Skilled Assistance Neededbarriers: Insight into deficits and Safety awareness    Assessment:     Geoffrey Smith is a 62 y.o. male admitted with a medical diagnosis of Lacunar stroke, acute.    Lacunar Infarct (Right sided)  Unilateral Left sided weakness  Hypokalemia   Patient Active Problem List   Diagnosis    Weakness of left lower extremity    Hypertension    Failure to thrive in adult    Lacunar stroke, acute     He presents with the following impairments/functional limitations:  weakness, impaired endurance, impaired sensation, impaired self care skills, impaired functional mobility, gait instability, impaired balance, impaired cognition, decreased coordination, decreased upper extremity function, decreased lower extremity function, decreased safety awareness, decreased ROM, impaired coordination, impaired fine motor .    Rehab Prognosis: Good; patient would benefit from acute skilled PT services to address these deficits and reach maximum level of function.    -continued supervised/assisted edge of bed activities and side steps w/ progression to away-from-bed activities as tolerated/appropriate    -pt not currently safe for bed-bedside chair OR bed-bedside commode transfers (side steps edge of bed only w/ assist x2 w/ rolling walker)    Recent Surgery: * No surgery found *      Plan:     During this hospitalization, patient to be seen  (3-5 TIMES/WEEK) to address the identified rehab impairments via gait training, therapeutic activities, therapeutic exercises, neuromuscular  re-education, wheelchair management/training and progress toward the following goals:    Plan of Care Expires:  04/06/23    Subjective     Chief Complaint: Lt UE/LE weakness, can't walk  Patient/Family Comments/goals: return to highest PLOF  Pain/Comfort:  Pain Rating 1: 0/10  Pain Addressed 1: Nurse notified  Pain Rating Post-Intervention 1: 0/10    Patients cultural, spiritual, Mandaen conflicts given the current situation: no    Living Environment:  -lives alone in camper w/ 3 steps w/o hand rail or grab handle w/ tub/shower combo    Prior to admission, patients level of function was independent w/ ADL's. Pt drives/shops/cooks and performs household chores.  Equipment used at home: none.  DME owned (not currently used): none.  Upon discharge, patient will have assistance from no family or friends.    Objective:     ROSE Isaac present for therapy session (evaluation)    Communicated with pt's nurse Small prior to session.  Patient found  supine in bed w/ HOB elevated w/ bed rails up x4  with peripheral IV  upon PT entry to room.    General Precautions: Standard, fall   Orthopedic Precautions:N/A   Braces: N/A  Respiratory Status: Room air    Exams:  Cognitive Exam:  Patient is oriented to Person and Place  Fine Motor Coordination:    -       Intact  RLE heel shin and LLE heel shin  Gross Motor Coordination:  LE toe taps - slowed/limited bilaterally w/ Lt more so than Rt  Sensation:    -       Intact  light/touch distal Rt LE  -       Impaired  light/touch distal Lt LE  RLE ROM: WFL  RLE Strength: WFL  LLE ROM: 50% of normal range  LLE Strength: grossly 2-/5    Functional Mobility:  Bed Mobility:     Rolling Left:  moderate assistance  Scooting: minimum assistance  Bridging: minimum assistance  Supine to Sit: moderate assistance  Sit to Supine: minimum assistance  Transfers:     Sit to Stand:  minimum assistance and of 2 persons with rolling walker  Gait: side steps to HOB (pt's Lt)  -patient ambulated 2ft with  Rolling Walker and moderate assistance x2 w/ decreased step length, decreased weight-shifting ability, and constant cues/coaxing  during gait due to impaired balance, impaired coordination, impaired motor control, decreased ROM, decreased sensation, impaired sensory feedback, and decreased strength.    -decreased upright posture  -decreased wt shift Rt  -decreased wt bearing Rt UE on rolling walker handle  -quickened/shortened step w/ Lt LE  -tactile and verbal cues for LE advancement  -pt's nurse Leara to room to assist w/ pt hygiene (pt noted to have a soiled adult diaper)  -standing balance x6 minutes w/ mod A w/ rolling walker assist w/ max A to extend Lt elbow and wt bear onto rolling walker handle    Balance - Sitting  Static:  Patient performed static sitting on level surface with  max A initially w/ improvement to mod A  and moderate verbal cues.   Dynamic:  Patient performed dynamic sitting on level surface with Maximal Assistance and moderate verbal cues during minimal excursions.    Balance - Standing  Static:  Patient performed static standing on level surface  using rolling walker with Maximal Assistance and moderate verbal cues.     Vitals   Vitals at Rest - sitting edge of bed  BP  178/119   HR  92   O2 Sat  97%      Vitals With Activity - after standing  BP  192/111   HR  82   O2 Sat  96%     Vitals With Activity - supine  BP  157/103   HR  86   O2 Sat  96%     Patient left  supine in bed w/ HOB elevated w/ bed rails up x HOB and Rt FOB  with all lines intact, call button in reach, pt's nurse notified notified, and tray table w/ breakfast tray sep-up provided by OT .    GOALS:   Multidisciplinary Problems       Physical Therapy Goals          Problem: Physical Therapy    Goal Priority Disciplines Outcome Goal Variances Interventions   Physical Therapy Goal     PT, PT/OT      Description: Goals to be met by: DISCHARGE     Patient will increase functional independence with mobility by performing:    -.  Supine to sit with Contact Guard Assistance  -. Sit to supine with Contact Guard Assistance  -. Rolling to Left and Right with Contact Guard Assistance  -. Sit to stand transfer with Minimal Assistance x1  -. Gait  x 25 feet with Minimal Assistance using Rolling Walker  -. Wheelchair propulsion x65 feet with Minimal Assistance using bilat UE/LE                     History:     History reviewed. No pertinent past medical history.    No past surgical history on file.    Time Tracking:     PT Received On: 03/09/23  PT Start Time: 0750     PT Stop Time: 0821  PT Total Time (min): 31 min     Billable Minutes: Evaluation 31      03/09/2023

## 2023-03-10 LAB
MAGNESIUM SERPL-MCNC: 2.2 MG/DL (ref 1.6–2.6)
PHOSPHATE SERPL-MCNC: 2.7 MG/DL (ref 2.3–4.7)

## 2023-03-10 PROCEDURE — 25000003 PHARM REV CODE 250: Performed by: STUDENT IN AN ORGANIZED HEALTH CARE EDUCATION/TRAINING PROGRAM

## 2023-03-10 PROCEDURE — 92523 SPEECH SOUND LANG COMPREHEN: CPT

## 2023-03-10 PROCEDURE — 97530 THERAPEUTIC ACTIVITIES: CPT

## 2023-03-10 PROCEDURE — 21400001 HC TELEMETRY ROOM

## 2023-03-10 PROCEDURE — 63600175 PHARM REV CODE 636 W HCPCS: Performed by: FAMILY MEDICINE

## 2023-03-10 PROCEDURE — 84100 ASSAY OF PHOSPHORUS: CPT | Performed by: FAMILY MEDICINE

## 2023-03-10 PROCEDURE — 99900035 HC TECH TIME PER 15 MIN (STAT)

## 2023-03-10 PROCEDURE — 97112 NEUROMUSCULAR REEDUCATION: CPT

## 2023-03-10 PROCEDURE — 83735 ASSAY OF MAGNESIUM: CPT | Performed by: FAMILY MEDICINE

## 2023-03-10 PROCEDURE — 94761 N-INVAS EAR/PLS OXIMETRY MLT: CPT

## 2023-03-10 RX ADMIN — ATORVASTATIN CALCIUM 80 MG: 40 TABLET, FILM COATED ORAL at 08:03

## 2023-03-10 RX ADMIN — LOSARTAN POTASSIUM 25 MG: 25 TABLET, FILM COATED ORAL at 08:03

## 2023-03-10 RX ADMIN — ASPIRIN 81 MG: 81 TABLET, CHEWABLE ORAL at 08:03

## 2023-03-10 RX ADMIN — ENOXAPARIN SODIUM 40 MG: 40 INJECTION SUBCUTANEOUS at 05:03

## 2023-03-10 RX ADMIN — CLOPIDOGREL BISULFATE 75 MG: 75 TABLET, FILM COATED ORAL at 08:03

## 2023-03-10 NOTE — PT/OT/SLP PROGRESS
Occupational Therapy   Treatment    Name: Geoffrey Smith  MRN: [id number]  Admitting Diagnosis:  Lacunar stroke, acute   with L sided weakness and expressive aphasia    Recommendations:     Discharge Recommendations: rehabilitation facility  Discharge Equipment Recommendations:  bedside commode, walker, rolling, wheelchair  Barriers to discharge:  Inaccessible home environment, Decreased caregiver support    Assessment:     Geoffrey Smith is a 62 y.o. male with a medical diagnosis of Lacunar stroke, acute.  He presents with delayed oral and motor responses, able to follow one-step verbal commands, motivated to participate and able to tolerate roughly 30 min session before c/o fatigue, no c/o pain at LUE with ROM and actively using dominant LUE to participate in functional tasks but very limited d/t weakness. Performance deficits affecting function are weakness, impaired endurance, impaired sensation, impaired self care skills, impaired functional mobility, impaired balance, impaired cognition, decreased upper extremity function, decreased lower extremity function, decreased safety awareness, decreased ROM, impaired coordination, impaired fine motor, impaired cardiopulmonary response to activity.     Rehab Prognosis:  Good; patient would benefit from acute skilled OT services to address these deficits and reach maximum level of function.       Plan:     Patient to be seen 2 x/week, 5 x/week (M-F) to address the above listed problems via therapeutic exercises, therapeutic activities, self-care/home management, neuromuscular re-education  Plan of Care Expires:  (D/C)  Plan of Care Reviewed with: patient    Subjective     Pain/Comfort:  Pain Rating 1: 0/10    Objective:     Communicated with: nurse Small prior to session.  Patient found HOB elevated with peripheral IV upon OT entry to room.    General Precautions: Standard, fall    Orthopedic Precautions:   Braces:    Respiratory Status: Room air     Occupational  Performance:     Bed Mobility:    Patient completed Rolling/Turning to Left with  minimum assistance  Patient completed Rolling/Turning to Right with minimum assistance  Patient completed Scooting/Bridging with moderate assistance  Patient completed Supine to Sit with moderate assistance  Patient completed Sit to Supine with minimum assistance     Functional Mobility/Transfers:  Patient completed Sit <> Stand Transfer with minimum assistance  with  hand-held assist   Functional Mobility: mod/max A to sidestep 2 steps toward HOB with assist to slide LLE, assist to weightshift, and support at L knee for LLE weightbearing during sidestepping with RLE    Activities of Daily Living:  Toileting: dependence pt with urine-soaked diaper requiring total A for clothing management and hygiene in bed; pt did not intiate request for assist with soiled diaper and did not initiate request for assist to toilet      Balance:   Static Sit: POOR+: Needs MINIMAL assist to maintain  Dynamic Sit: FAIR+: Maintains balance through MINIMAL excursions of active trunk motion  Static Stand: 0: Needs MAXIMAL assist to maintain   Dynamic stand: 0: N/A    Vestibular Training/Neuromuscular ReEducation:  Balance training seated EOB with both verbal and tactile cues required to maintain midline once positioned after initial education on goals to maintain midline orientation.  Mod/max A to maintain LUE weightbearing at L side in sitting with cues to facilitate weightbearing and weightshift back onto R hip with L lateral lean especially with fatigue.  Dual cognitive task training with orientation questions while sitting EOB with noted difficulty maintain sitting balance during cognitive tasks requiring cues to redirect attention.  Pt able to maintain static sitting balance in midline for short periods (up to 20 seconds) with SBA and to maintain dynamic sitting balance with some improvement during RUE excursions to targets across midline and  unilaterally.    Treatment & Education:  LUE therapeutic activity to isolate AAROM with cues to facilitate motor control all joints all planes; pt in supine with HOB slightly elevated to facilitate attention to task.  Pt required extra time to follow commands for engagement throughout available AAROM during o/h reaching, elbow flexion/triceps extn for reaching hand to L ear, able to perform supination/pronation AROM without physical assist, and with noted difficulty composite digital extension and wrist extension but able to initiate movement to roughly 15 degrees wrist extn.    Patient left HOB elevated with all lines intact, call button in reach, nurse Geovanny notified, and pt's bed in lowest position.    GOALS:   Multidisciplinary Problems       Occupational Therapy Goals          Problem: Occupational Therapy    Goal Priority Disciplines Outcome Interventions   Occupational Therapy Goal     OT, PT/OT Ongoing, Progressing    Description: Pt will sit EOB with SBA for balance, maintaining midline orientation with verbal cues alone for 15 min.    Patient will perform grooming with mod assist while standing at sink.    Patient will perform toileting with mod assist using BSC.    Patient will perform toilet transfer with min assist with use of RW.    Patient will perform upper body dressing with mod assist while seated EOB.                              Time Tracking:     OT Date of Treatment: 03/10/23  OT Start Time: 0924  OT Stop Time: 1003  OT Total Time (min): 39 min    Billable Minutes:Therapeutic Activity 15  Neuromuscular Re-education 24    OT/VLAD: OT          3/10/2023

## 2023-03-10 NOTE — PROGRESS NOTES
[facility] Medicine  Progress Note    Patient Name: Geoffrey Smith  MRN: [id number]  Patient Class: IP- Inpatient   Admission Date: 3/7/2023  Length of Stay: 3 days  Attending Physician: Jagjit Tavarez MD  Primary Care Provider: Primary Doctor No        Subjective:     Principal Problem:Lacunar stroke, acute      HPI:  Geoffrey Smith is a 62 y.o. male who has not seen a physician in many years and his only past medical history is nicotine use disorder. The patient presented to [facility] ED on 3/7/2023 with a primary complaint of frequent falls.  He states that he most recently fell on Monday however he denies any significant pain throughout.  He reports that he is had loss of bladder and bowel continence over the past year and has required depends for several months.  He states that he has not walked in the last year.  He was living on his own in a trailer prior to the past week where he was able to move in with his older brother.  His older brother felt as though he could not take care of him safely and had EMS picked him up to be hospitalized and seek nursing home care.  Patient states that he has no complaints at this time besides the lower extremity weakness and some mild back pain.      Overview/Hospital Course:  No notes on file    Interval History: NAEON. Patient with minimal changes. Continued left sided weakness.  Otherwise he voices no complaints.  Pending placement.    ROS: Negative Except for HPI  Objective:     Vital Signs (Most Recent):  Temp: 97.6 °F (36.4 °C) (03/09/23 0817)  Pulse: 83 (03/09/23 0817)  Resp: 20 (03/09/23 0817)  BP: (!) 157/103 (03/09/23 0817)  SpO2: 98 % (03/09/23 0817)   Vital Signs (24h Range):  Temp:  [97.6 °F (36.4 °C)-98.3 °F (36.8 °C)] 97.6 °F (36.4 °C)  Pulse:  [69-98] 83  Resp:  [16-20] 20  SpO2:  [95 %-98 %] 98 %  BP: (138-192)/() 157/103     Weight: 75.8 kg (167 lb)  There is no height or weight on file to calculate  BMI.    Intake/Output Summary (Last 24 hours) at 3/9/2023 0843  Last data filed at 3/8/2023 1548  Gross per 24 hour   Intake --   Output 100 ml   Net -100 ml          Physical Exam  Constitutional:       General: He is not in acute distress.     Comments: Elderly appearing white male lying in bed. Appears weak and continues to speak/answer questions slowly.  HENT:      Right Ear: External ear normal.      Left Ear: External ear normal.      Mouth/Throat:      Mouth: Mucous membranes are moist.   Eyes:      General: No scleral icterus.     Extraocular Movements: Extraocular movements intact.      Pupils: Pupils are equal, round, and reactive to light.   Cardiovascular:      Rate and Rhythm: Normal rate and regular rhythm.      Pulses: Normal pulses.      Heart sounds: Normal heart sounds. No murmur heard.  Pulmonary:      Effort: Pulmonary effort is normal.      Breath sounds: Normal breath sounds. No wheezing, rhonchi or rales.   Abdominal:      General: Bowel sounds are normal. There is no distension.      Palpations: Abdomen is soft. There is no mass.      Tenderness: There is no abdominal tenderness or suprapubic tendernes.  There is no right CVA tenderness or left CVA tenderness.   Musculoskeletal:         General: No swelling.      Cervical back: Neck supple. No tenderness.      Right lower leg: No edema.      Left lower leg: No edema.   Lymphadenopathy:      Cervical: No cervical adenopathy.   Skin:     General: Skin is warm and dry.      Capillary Refill: Capillary refill takes less than 2 seconds.      Coloration: Skin is not jaundiced.      Comments: B/L nontender calves, equal in size.   Neurological:      Mental Status: He is alert and oriented to person, place, and time. 2+ DTR knee B/L; Babinski reflexes neg B/L.  2+/5 strength in LLE; 3/5 RLE strength, proximal weakness notable vs distal weakness  5/5 UE strength B/L; strength improving overall in left upper and lower extremities.      Significant  Labs: All pertinent labs within the past 24 hours have been reviewed.    Recent Lab Results         03/09/23  0412   03/08/23  1143        Albumin/Globulin Ratio 1.1         Albumin 3.1         Alkaline Phosphatase 101         ALT 22         Ao root annulus   3.50       Ao peak junior   1.05       Ao VTI   23.7       AST 18         AV valve area   2.71       AV mean gradient   2       AV index (prosthetic)   0.88       AV peak gradient   4       AV Velocity Ratio   0.91       Baso # 0.06         Basophil % 0.8         BILIRUBIN TOTAL 0.7         BUN 8.4         Calcium 8.8         Chloride 105         CO2 22         Creatinine 0.69         E/A ratio   0.82       E/E' ratio   4.48       eGFR >60         EF   60       Eos # 0.32         Eosinophil % 4.1         E wave deceleration time   130.91       Globulin, Total 2.9         Glucose 96         Hematocrit 44.0         Hemoglobin 14.6         Mitral Valve Heart Rate   68       Immature Grans (Abs) 0.04         Immature Granulocytes 0.5         LA size   3.58       LA volume   30.00       LVOT area   3.1       LV LATERAL E/E' RATIO   3.62       LV SEPTAL E/E' RATIO   5.88       LV EDV BP   75.00       Left Ventricular Outflow Tract Mean Gradient   1.96       Left Ventricular Outflow Tract Mean Velocity   0.66       LVOT diameter   1.98       LVOT peak junior   0.96       LVOT stroke volume   64.32       LVOT peak VTI   20.90       LV ESV BP   33.00       Lymph # 1.80         LYMPH % 22.8         Magnesium 2.10         MCH 29.1         MCHC 33.2         MCV 87.6         Mean e'   0.11       Mono # 0.78         Mono % 9.9         MPV 11.1         MV valve area by continuity eq   2.88       MV mean gradient   1       MV peak gradient   3       MV Peak A Junior   0.57       MV Peak E Junior   0.47       MV VTI   22.3       Neut # 4.90         Neut % 61.9         nRBC 0.0         Phosphorus 2.4         Platelets 207         Potassium 3.8         PROTEIN TOTAL 6.0         Right Atrial  Pressure (from IVC)   3       RA Width   4.40       RBC 5.02         RDW 13.3         Sodium 136         TAPSE   1.72       TDI SEPTAL   0.08       TDI LATERAL   0.13       WBC 7.9                 Significant Imaging: I have reviewed all pertinent imaging results/findings within the past 24 hours.    Narrative & Impression  EXAMINATION:  MRI BRAIN W WO CONTRAST     CLINICAL HISTORY:  Neuro deficit, persistent/recurrent, CNS neoplasm suspected;     TECHNIQUE:  Multiplanar, multisequence MR images of the brain were obtained with and without administration of intravenous contrast.     COMPARISON:  CT head dated 03/07/2023     FINDINGS:  There is a small focus of restricted diffusion in the right internal capsule.  There is no evidence of hemorrhagic transformation.  There are moderate patchy T2/FLAIR hyperintensities in the subcortical and periventricular white matter and randal, with multiple prior lacunar infarcts in the basal ganglia.  There is no abnormal parenchymal or leptomeningeal enhancement.     There is no mass effect or midline shift.  The basal cisterns are patent.  There is moderate diffuse parenchymal volume loss.  There is no hydrocephalus or abnormal extra-axial fluid collection.  The major intracranial flow voids are patent.  There is trace scattered paranasal sinus mucosal thickening.  Left mastoid effusion is noted.     Impression:     1. Acute lacunar infarct in the right internal capsule without hemorrhage.  2. Moderate chronic microvascular ischemic changes.        Electronically signed by: Beth Small  Date:                                            03/08/2023  Time:                                           09:50           Exam Ended: 03/08/23 09:28 Last Resulted: 03/08/23 09:50             Assessment/Plan:      Lacunar Infarct (Right sided)  Unilateral Left sided weakness  -NIH stroke score of 5(moderate stroke)  -MRI of brain showed an acute lacunar infarct in the right internal capsule  without hemorrhage and chronic microvascular ischemic changes.  -CT lumbar spine no large disc herniation and mild neural foraminal narrowing bilaterally at L4-L5  -CXR clear without acute cardiopulmonary process identified  -Continue DAPT for 21 days then d/c plavix.  -Continue Statin  -UDS negative  - Working with PT/OT who recommend rehab placement clinical  - Case management consulted, reached out to the rehab facility and the nurse had come for evaluation    Hypokalemia - resolved  -Replaced with PO Kcl   -Replete as needed         CODE STATUS: Full Code  Access: Peripheral  Antibiotics: none  Diet: Regular  GI Prophylaxis: none needed  Fluids:  None  DVT ppx: Lovenox 40 daily           Disposition: Day 3 of admission for unilateral left sided weakness with deconditioning and failure to thrive. Discharge pending rehab placement. CM aware.       Lawrence Romo MD  Department of Hospital Medicine   [facility] Med Surg Telemetry

## 2023-03-10 NOTE — PT/OT/SLP EVAL
"[facility]  [facility]  SLP COGNITIVE-LINGUISTIC EVALUATION Initial           Patient Name: Geoffrey Smith    YOB: 1960    MRN:  [id number]    Admit Date: 3/7/2023         Pain:   0/10  Pain Location / Description: no pain reported            HISTORY & PHYSICAL:    Active Ambulatory Problems     Diagnosis Date Noted    No Active Ambulatory Problems     Resolved Ambulatory Problems     Diagnosis Date Noted    No Resolved Ambulatory Problems     No Additional Past Medical History     Per medical record:   "Geoffrey Smith is a 62 y.o. male who has not seen a physician in many years and his only past medical history is nicotine use disorder. The patient presented to [facility] ED on 3/7/2023 with a primary complaint of frequent falls.  He states that he most recently fell on Monday however he denies any significant pain throughout.  He reports that he is had loss of bladder and bowel continence over the past year and has required depends for several months.  He states that he has not walked in the last year.  He was living on his own in a trailer prior to the past week where he was able to move in with his older brother.  His older brother felt as though he could not take care of him safely and had EMS picked him up to be hospitalized and seek nursing home care.  Patient states that he has no complaints at this time besides the lower extremity weakness and some mild back pain." Speech pathology consulted to assess cognitive skills post CVA.    Imaging:  MRI Brain: 3/5/23  Impression:     1. Acute lacunar infarct in the right internal capsule without hemorrhage.  2. Moderate chronic microvascular ischemic changes.    CXR: 3/7/23  Impression:     NO ACUTE CARDIOPULMONARY PROCESS IDENTIFIED.           Education & Employment History: Per patient's report, patient completed high school up to the 9th grade. Currently unemployed.            GENERAL INFORMATION:     Hearing: intact " bilaterally  Oxygenation:  room air  Behavior:cooperative, friendly and cooperative,    Level of Consciousness: alert  Orientation Level: Ox2          IMPRESSION:    Patient presented with the UMS ([employer] Mental Status) examination to assess cognitive skills. This assessment serves as a screener to identify deficits of the following areas: orientation, memory, attention, visuospatial and executive functioning skills. Patient demonstrated moderate deficits in all above areas and would benefit from continued skilled ST services.          PROGNOSIS:     Fair          THERAPY PLAN:     Services: Skilled SLP services recommended   Type of Treatment: Individual   Frequency of Treatment:  1-5 time(s)/week   Length of Session:  15-30 minutes   Additional SLP Assessments: n/a    Additional Consults: n/a   Next Level of Care Recommendations:  rehab          GOALS:     Long Term Goals:     Patient will exhibit adequate memory recall skills with 90% accuracy to maintain independent status.   Patient will demonstrate 100% safety awareness skills during ADLs in current environment.    Short Term Goals:      Patient will complete memory recall tasks with 80% accuracy given min cues in opportunities given.  Patient will complete sequencing tasks with 80% accuracy given min cues  Patient will demonstrate adequate attention skills requiring <3 verbal cues for redirection during functional task.                  *If this is the last documented treatment note, then it will signify discharge from acute care prior to discharge from speech services and will serve as the discharge summary.*          EDUCATION:      Patient, was educated on the results and recommendations of this evaluation. He expressed understanding. Patient will benefit from ongoing education.             Du Boyle M.S. CCC-SLP  [facility]

## 2023-03-10 NOTE — PLAN OF CARE
Mimi from [facility] has been here to see patient and they are interested in taking him. Her concern is a DC plan from rehab. Patient's sister, Peggy, P: [phone], would like patient to go to [facility] in [place]. Patient is in agreement. Referral has been sent via [employer]. [facility] has been called and PASSR has been faxed to [facility]. Will follow.   Female

## 2023-03-10 NOTE — MEDICAL/APP STUDENT
"[facility] Medicine Wards Progress Note     Resident Team: [facility] Medicine List 1  Attending Physician: Jagjit Tavarez MD      Subjective:      Brief HPI:  Mr. Smith is a 62 y.o. male with a PMH of nicotine use disorder. He has not followed with a physician in many years. Patient presented to the ED by EMS on 23 with a chief complaint of frequent falls. His most recent fall was on Monday, , but did not sustain an injury. Reports bowel and bladder incontinence for the past year and wears depends. He is able to get up and walk to the restroom on his own when he feels like he has to go. He was living alone but recently moved in with his brother who feels that he cannot take care of him safely.     Patient underwent an MRI in the ED which showed an acute lacunar infarct in the right internal capsule without hemorrhage and moderate chronic microvascular ischemic changes. He was found to be significantly weaker on the left side compared to the right. Patient was admitted for further care and to seek nursing home placement.     Interval History: Afebrile. VSS. Losartan was added on yesterday due to some hypertension and BP was well-controlled overnight. A referral has been sent to [facility] and the nurse came for evaluation. Patient reports no CP, SOB, nausea, vomiting, or abdominal pain.     Review of Systems:  ROS completed and negative except as indicated above.     Objective:     Last 24 Hour Vital Signs:  BP  Min: 129/87  Max: 190/112  Temp  Av.9 °F (36.6 °C)  Min: 97.4 °F (36.3 °C)  Max: 98.1 °F (36.7 °C)  Pulse  Av.6  Min: 74  Max: 101  Resp  Av.4  Min: 16  Max: 20  SpO2  Av.5 %  Min: 94 %  Max: 97 %  Height  Av' 7.5" (171.5 cm)  Min: 5' 7.5" (171.5 cm)  Max: 5' 7.5" (171.5 cm)  Weight  Av.8 kg (167 lb)  Min: 75.8 kg (167 lb)  Max: 75.8 kg (167 lb)  I/O last 3 completed shifts:  In: 810 [P.O.:810]  Out: 200 [Urine:200]    Physical Examination:  Physical " Exam  Constitutional:       General: He is not in acute distress.     Appearance: He is not ill-appearing.   HENT:      Right Ear: External ear normal.      Left Ear: External ear normal.   Eyes:      Extraocular Movements: Extraocular movements intact.      Conjunctiva/sclera: Conjunctivae normal.      Pupils: Pupils are equal, round, and reactive to light.   Cardiovascular:      Rate and Rhythm: Normal rate and regular rhythm.      Pulses: Normal pulses.      Heart sounds: No murmur heard.    No friction rub. No gallop.   Pulmonary:      Effort: Pulmonary effort is normal. No respiratory distress.      Breath sounds: Normal breath sounds. No stridor. No wheezing.   Abdominal:      General: Abdomen is flat.      Palpations: Abdomen is soft.   Musculoskeletal:      Cervical back: Normal range of motion and neck supple.   Neurological:      Mental Status: He is alert and oriented to person, place, and time.      Comments: Patient is oriented but does not always appear to understand the questions being asked to him. Motor weakness on the left side. 3/5 strength in LUE and 4/5 in LLE. Sensation intact bilaterally. Reflexes 2+.        Laboratory:  Most Recent Data:  CBC:   Lab Results   Component Value Date    WBC 7.9 03/09/2023    HGB 14.6 03/09/2023    HCT 44.0 03/09/2023     03/09/2023    MCV 87.6 03/09/2023    RDW 13.3 03/09/2023     WBC Differential:   Recent Labs   Lab 03/07/23  2204 03/09/23  0412   WBC 9.7 7.9   HGB 15.5 14.6   HCT 46.3 44.0    207   MCV 88.4 87.6     BMP:   Lab Results   Component Value Date     03/09/2023    K 3.8 03/09/2023    CO2 22 (L) 03/09/2023    BUN 8.4 03/09/2023    CREATININE 0.69 (L) 03/09/2023    CALCIUM 8.8 03/09/2023    MG 2.20 03/10/2023    PHOS 2.7 03/10/2023     LFTs:   Lab Results   Component Value Date    ALBUMIN 3.1 (L) 03/09/2023    BILITOT 0.7 03/09/2023    AST 18 03/09/2023    ALKPHOS 101 03/09/2023    ALT 22 03/09/2023     Coags: No results found for:  INR, PROTIME, PTT  FLP: No results found for: CHOL, HDL, LDLCALC, TRIG, CHOLHDL  DM:   Lab Results   Component Value Date    CREATININE 0.69 (L) 2023     Thyroid:   Lab Results   Component Value Date    TSH 1.587 2023     Anemia: No results found for: IRON, TIBC, FERRITIN, AQRRCAOL55, FOLATE  Cardiac: No results found for: TROPONINI, CKTOTAL, CKMB, BNP      Other Results:  EK23  Vent. Rate : 079 BPM     Atrial Rate : 079 BPM      P-R Int : 108 ms          QRS Dur : 082 ms       QT Int : 400 ms       P-R-T Axes : 047 055 040 degrees      QTc Int : 458 ms     Sinus rhythm with short AZ   Otherwise normal ECG   No previous ECGs available   Confirmed by Wil Hansen MD ([id number]) on 3/8/2023 4:19:32 PM       Echo: 23  There is no evidence of intracardiac shunting.  The left ventricle is normal in size with normal systolic function.  The estimated ejection fraction is 60%.  Normal left ventricular diastolic function.  Normal right ventricular size with normal right ventricular systolic function.  Normal central venous pressure (3 mmHg).      Radiology:  CXR - 23  Impression: NO ACUTE CARDIOPULMONARY PROCESS IDENTIFIED    CT Head w/o contrast - 23  Impression:  No acute intracranial findings.  No significant discrepancy with the preliminary report.    CT Lumbar Spine w/o contrast - 23:  Impression:   No acute fracture identified.  No significant change from the [facility] interpretation.     MRI Brain w/ + w/o contrast - 23:  Impression:  1. Acute lacunar infarct in the right internal capsule without hemorrhage.  2. Moderate chronic microvascular ischemic changes.      Current Medications:     Infusions:       Scheduled:   aspirin  81 mg Oral Daily    atorvastatin  80 mg Oral QHS    clopidogreL  75 mg Oral Daily    enoxaparin  40 mg Subcutaneous Daily    losartan  25 mg Oral Daily        PRN:  albuterol, hydrALAZINE, labetalol, naloxone, sodium chloride  0.9%      Assessment & Plan:     1) Right-sided lacunar infarct      Left sided weakness       - NIH stroke score of 5 (moderate stroke)       - CT head w/o contrast - 03/07: no acute intracranial findings       - CT lumbar spine w/o contrast - 03/07: no acute fracture identified, there are multilevel degenerative changes with marginal osteophytes and facet arthropathy. No large disc herniation. Mild neural foraminal narrowing bilaterally at L4-5. No paraspinal hematoma.        - MRI - 03/08: acute lacunar infarct in the right internal capsule without hemorrhage and moderate chronic microvascular ischemic changes       - ASA 81 mg QD, clopidogrel 75 mg - continue for 21 days then d/c clopidogrel       - Atorvastatin 80 mg - continue indefinitely       - Continue working with PT/OT - recommended placement at rehab facility       - Referral has been sent to [facility]. A nurse has come to evaluate him already.        - Family prefers nursing home over rehab facility but plan to discuss that rehab is the best option for his recovery at this time and can consider nursing facility after finishing rehab       - CBC, CMP, phosphorous, and magnesium Q48h    2) Hypertension       - Added losartan 25 mg QD       - PRN IV hydralazine 10 mg q4h for SBP > 220 oor DBP > 120       - PRN IV labetalol 10 mg q4h for SBP > 220 oor DBP > 120 or HR > 70       - BP remained WNL overnight    3) Hypokalemia - resolved       - potassium chloride 40 mEq x2       - replete as needed          CODE STATUS: full  Access: pIV  Antibiotics: none  Diet: regular  DVT Prophylaxis: enoxaparin 40 mg  GI Prophylaxis: none  Fluids: none      Disposition: This is day 3 of admission for Mr. Smith following a right sided lacunar infarct with residual left sided weakness. Currently working on rehab placement, will continue to follow until placement is made. Follow with PT/OT daily.       Gay Romano  [employer] MS3

## 2023-03-10 NOTE — PT/OT/SLP PROGRESS
Physical Therapy Treatment    Patient Name:  Geoffrey Smith   MRN:  [id number]    Recommendations:     Discharge Recommendations: rehabilitation facility  Discharge Equipment Recommendations: bedside commode, wheelchair (rolling walker vs tripp-walker)  Equipment to be obtained for discharge: bedside commode, wheelchair (rolling walker vs tripp-walker)  Barriers to discharge:  Barriers to discharge: Severity of deficits, Decreased caregiver support, Cognitive Status, and Level of Skilled Assistance Neededbarriers: Insight into deficits and Safety awareness    Assessment:     Geoffrey Smith is a 62 y.o. male admitted with a medical diagnosis of Lacunar stroke, acute.    Lacunar Infarct (Right sided)  Unilateral Left sided weakness  Hypokalemia       Patient Active Problem List   Diagnosis    Weakness of left lower extremity    Hypertension    Failure to thrive in adult    Lacunar stroke, acute     He presents with the following impairments/functional limitations: weakness, impaired endurance, impaired self care skills, impaired functional mobility, gait instability, impaired balance, impaired cognition, decreased coordination, decreased upper extremity function, decreased lower extremity function, decreased safety awareness, impaired coordination, decreased ROM, impaired fine motor.    Rehab Prognosis: Good; patient would benefit from acute skilled PT services to address these deficits and reach maximum level of function.    -continued supervised/assisted edge of bed activities and side steps w/ progression to away-from-bed activities as tolerated/appropriate    Recent Surgery: * No surgery found *      Plan:     During this hospitalization, patient to be seen  (3-5 times/week) to address the identified rehab impairments via therapeutic activities, therapeutic exercises, gait training, neuromuscular re-education, wheelchair management/training and progress toward the following goals:    Plan of Care Expires:   04/06/23    Subjective     Chief Complaint: none  Patient/Family Comments/goals: none verbalized  Pain/Comfort:  Pain Rating 1: 0/10  Pain Addressed 1: Nurse notified  Pain Rating Post-Intervention 1: 0/10    Objective:     Communicated with pt's nurse Small prior to session.  Patient found  supine in bed w/ HOB elevated w/ bed rails up x4  with peripheral IV  with peripheral IV upon PT entry to room.     General Precautions: Standard, fall  Orthopedic Precautions: N/A  Braces: N/A  Respiratory Status: Room air     Functional Mobility:  Bed Mobility:     Rolling Left:  minimum assistance  Scooting: moderate assistance  Bridging: moderate assistance  Supine to Sit: moderate assistance  Sit to Supine: minimum assistance    Sitting Balance Activities:  -wt shifting  -cues for proper hand positioning and wt bearing to maintain upright posture  -shifting hip (1 at at time) fwd and bwd to achieve improved posture and positioning at edge of bed  -donned bilat  socks  -bilat LE ex's - heel/toe raises x10, heel slides fwd/bwd x10, hip flex x5    Transfers:     Sit to Stand:  minimum assistance with rolling walker  -sit-stand x2    Gait: side steps to HOB (pt's Lt)  -patient ambulated 3ft with Rolling Walker and moderate assistance   -w/ cues pt able to raise Lt LE and side step x2 (remainder of time pt sliding Lt foot laterally)  -requires assist to wt shift Rt for Lt LE advancement    -decreased upright posture  -decreased wt shift Rt  -decreased wt bearing Rt UE on rolling walker handle  -quickened/shortened step w/ Lt LE  -tactile and verbal cues for Lt LE advancement  -slowed/guarded mvmts  -cues for all activities    Balance - Sitting  Static:  Patient performed static sitting on level surface with  mod A  and moderate verbal cues.   Dynamic:  Patient performed dynamic sitting on level surface with Maximal Assistance and moderate verbal cues during minimal excursions.     Balance - Standing  Static:  Patient  performed static standing on level surface  using rolling walker with Maximal Assistance and moderate verbal cues.      Patient left  supine in bed w/ HOB elevated w/ bed rails up w/ Lt UE elevated with all lines intact, call button in reach, pt's nurse notified notified, and tray table at bedside.    GOALS:   Multidisciplinary Problems       Physical Therapy Goals       Problem: Physical Therapy    Goal Priority Disciplines Outcome Goal Variances Interventions   Physical Therapy Goal     PT, PT/OT Ongoing, Progressing     Description: Goals to be met by: DISCHARGE     Patient will increase functional independence with mobility by performing:    -. Supine to sit with Contact Guard Assistance  -. Sit to supine with Contact Guard Assistance  -. Rolling to Left and Right with Contact Guard Assistance  -. Sit to stand transfer with Minimal Assistance x1  -. Gait  x 25 feet with Minimal Assistance using Rolling Walker  -. Wheelchair propulsion x65 feet with Minimal Assistance using bilat UE/LE           Time Tracking:     PT Received On: 03/10/23  PT Start Time: 1547     PT Stop Time: 1620  PT Total Time (min): 33 min     Billable Minutes: Therapeutic Activity 33    Treatment Type: Treatment  PT/PTA: PT         03/10/2023

## 2023-03-11 LAB
ALBUMIN SERPL-MCNC: 3.1 G/DL (ref 3.4–4.8)
ALBUMIN/GLOB SERPL: 1.1 RATIO (ref 1.1–2)
ALP SERPL-CCNC: 106 UNIT/L (ref 40–150)
ALT SERPL-CCNC: 31 UNIT/L (ref 0–55)
AST SERPL-CCNC: 25 UNIT/L (ref 5–34)
BASOPHILS # BLD AUTO: 0.06 X10(3)/MCL (ref 0–0.2)
BASOPHILS NFR BLD AUTO: 0.6 %
BILIRUBIN DIRECT+TOT PNL SERPL-MCNC: 0.8 MG/DL
BUN SERPL-MCNC: 13.8 MG/DL (ref 8.4–25.7)
CALCIUM SERPL-MCNC: 9.2 MG/DL (ref 8.8–10)
CHLORIDE SERPL-SCNC: 105 MMOL/L (ref 98–107)
CO2 SERPL-SCNC: 22 MMOL/L (ref 23–31)
CREAT SERPL-MCNC: 0.71 MG/DL (ref 0.73–1.18)
EOSINOPHIL # BLD AUTO: 0.27 X10(3)/MCL (ref 0–0.9)
EOSINOPHIL NFR BLD AUTO: 2.8 %
ERYTHROCYTE [DISTWIDTH] IN BLOOD BY AUTOMATED COUNT: 13.6 % (ref 11.5–17)
GFR SERPLBLD CREATININE-BSD FMLA CKD-EPI: >60 MLS/MIN/1.73/M2
GLOBULIN SER-MCNC: 2.8 GM/DL (ref 2.4–3.5)
GLUCOSE SERPL-MCNC: 92 MG/DL (ref 82–115)
HCT VFR BLD AUTO: 43.7 % (ref 42–52)
HGB BLD-MCNC: 14.5 G/DL (ref 14–18)
IMM GRANULOCYTES # BLD AUTO: 0.08 X10(3)/MCL (ref 0–0.04)
IMM GRANULOCYTES NFR BLD AUTO: 0.8 %
LYMPHOCYTES # BLD AUTO: 1.78 X10(3)/MCL (ref 0.6–4.6)
LYMPHOCYTES NFR BLD AUTO: 18.7 %
MCH RBC QN AUTO: 29.2 PG
MCHC RBC AUTO-ENTMCNC: 33.2 G/DL (ref 33–36)
MCV RBC AUTO: 88.1 FL (ref 80–94)
MONOCYTES # BLD AUTO: 1.03 X10(3)/MCL (ref 0.1–1.3)
MONOCYTES NFR BLD AUTO: 10.8 %
NEUTROPHILS # BLD AUTO: 6.28 X10(3)/MCL (ref 2.1–9.2)
NEUTROPHILS NFR BLD AUTO: 66.3 %
NRBC BLD AUTO-RTO: 0 %
PLATELET # BLD AUTO: 236 X10(3)/MCL (ref 130–400)
PMV BLD AUTO: 11.8 FL (ref 7.4–10.4)
POTASSIUM SERPL-SCNC: 4.2 MMOL/L (ref 3.5–5.1)
PROT SERPL-MCNC: 5.9 GM/DL (ref 5.8–7.6)
RBC # BLD AUTO: 4.96 X10(6)/MCL (ref 4.7–6.1)
SODIUM SERPL-SCNC: 136 MMOL/L (ref 136–145)
WBC # SPEC AUTO: 9.5 X10(3)/MCL (ref 4.5–11.5)

## 2023-03-11 PROCEDURE — 80053 COMPREHEN METABOLIC PANEL: CPT | Performed by: FAMILY MEDICINE

## 2023-03-11 PROCEDURE — 63600175 PHARM REV CODE 636 W HCPCS: Performed by: FAMILY MEDICINE

## 2023-03-11 PROCEDURE — 11000001 HC ACUTE MED/SURG PRIVATE ROOM

## 2023-03-11 PROCEDURE — 85025 COMPLETE CBC W/AUTO DIFF WBC: CPT | Performed by: FAMILY MEDICINE

## 2023-03-11 PROCEDURE — 99900035 HC TECH TIME PER 15 MIN (STAT)

## 2023-03-11 PROCEDURE — 25000003 PHARM REV CODE 250: Performed by: STUDENT IN AN ORGANIZED HEALTH CARE EDUCATION/TRAINING PROGRAM

## 2023-03-11 PROCEDURE — 94761 N-INVAS EAR/PLS OXIMETRY MLT: CPT

## 2023-03-11 RX ADMIN — LOSARTAN POTASSIUM 25 MG: 25 TABLET, FILM COATED ORAL at 09:03

## 2023-03-11 RX ADMIN — ASPIRIN 81 MG: 81 TABLET, CHEWABLE ORAL at 09:03

## 2023-03-11 RX ADMIN — CLOPIDOGREL BISULFATE 75 MG: 75 TABLET, FILM COATED ORAL at 09:03

## 2023-03-11 RX ADMIN — ENOXAPARIN SODIUM 40 MG: 40 INJECTION SUBCUTANEOUS at 04:03

## 2023-03-11 RX ADMIN — ATORVASTATIN CALCIUM 80 MG: 40 TABLET, FILM COATED ORAL at 08:03

## 2023-03-11 NOTE — PROGRESS NOTES
[facility] [place] [facility] Medicine  Progress Note    Patient Name: Geoffrey Smith  MRN: [id number]  Patient Class: IP- Inpatient   Admission Date: 3/7/2023  Length of Stay: 4 days  Attending Physician: Corinne Villalba MD  Primary Care Provider: Primary Doctor No        Subjective:     Principal Problem:Lacunar stroke, acute        HPI:  Geoffrey Smith is a 62 y.o. male who has not seen a physician in many years and his only past medical history is nicotine use disorder. The patient presented to [facility] ED on 3/7/2023 with a primary complaint of frequent falls.  He states that he most recently fell on Monday however he denies any significant pain throughout.  He reports that he is had loss of bladder and bowel continence over the past year and has required depends for several months.  He states that he has not walked in the last year.  He was living on his own in a trailer prior to the past week where he was able to move in with his older brother.  His older brother felt as though he could not take care of him safely and had EMS picked him up to be hospitalized and seek nursing home care.  Patient states that he has no complaints at this time besides the lower extremity weakness and some mild back pain.      Interval History: Patient tolerating diet well with NAEON. He had no complaints this morning. Continues to use adult diapers and mentation unchanged from admission believed to be patient's baseline.    Objective:     Vital Signs (Most Recent):  Temp: 98.1 °F (36.7 °C) (03/11/23 0701)  Pulse: 88 (03/11/23 0701)  Resp: 16 (03/11/23 0701)  BP: 134/85 (03/11/23 0701)  SpO2: 95 % (03/11/23 0800) Vital Signs (24h Range):  Temp:  [97.7 °F (36.5 °C)-98.3 °F (36.8 °C)] 98.1 °F (36.7 °C)  Pulse:  [78-88] 88  Resp:  [16-18] 16  SpO2:  [94 %-97 %] 95 %  BP: (126-151)/(78-96) 134/85     Weight: 75.8 kg (167 lb)  Body mass index is 25.77 kg/m².    Intake/Output Summary (Last 24 hours) at 3/11/2023  0855  Last data filed at 3/10/2023 1752  Gross per 24 hour   Intake 950 ml   Output --   Net 950 ml      Physical Exam  Constitutional:       General: He is not in acute distress.     Comments: Elderly appearing white male lying in bed. Appears weak and continues to speak/answer questions slowly.  HENT:      Right Ear: External ear normal.      Left Ear: External ear normal.      Mouth/Throat:      Mouth: Mucous membranes are moist.   Eyes:      General: No scleral icterus.     Extraocular Movements: Extraocular movements intact.      Pupils: Pupils are equal, round, and reactive to light.   Cardiovascular:      Rate and Rhythm: Normal rate and regular rhythm.      Pulses: Normal pulses.      Heart sounds: Normal heart sounds. No murmur heard.  Pulmonary:      Effort: Pulmonary effort is normal on RA.     Breath sounds: Normal breath sounds. No wheezing, rhonchi or rales.   Abdominal:      General: Bowel sounds are normal. There is no distension.      Palpations: Abdomen is soft. There is no mass.      Tenderness: There is no abdominal tenderness or suprapubic tendernes.  There is no right CVA tenderness or left CVA tenderness.   Musculoskeletal:         General: No swelling.      Cervical back: Neck supple. No tenderness.      Right lower leg: No edema.      Left lower leg: No edema.   Lymphadenopathy:      Cervical: No cervical adenopathy.   Skin:     General: Skin is warm and dry.      Capillary Refill: Capillary refill takes less than 2 seconds.      Coloration: Skin is not jaundiced.      Comments: B/L nontender calves, equal in size.   Neurological:      Mental Status: He is alert and oriented to person, place, and time. 2+ DTR knee B/L; Babinski reflexes neg B/L.  2+/5 strength in LLE; 3/5 RLE strength, proximal weakness notable vs distal weakness  5/5 UE strength B/L; strength improving overall in left upper and lower extremities.      Significant Labs: All pertinent labs within the past 24 hours have been  reviewed.  Recent Lab Results         03/11/23  0312        Albumin/Globulin Ratio 1.1       Albumin 3.1       Alkaline Phosphatase 106       ALT 31       AST 25       Baso # 0.06       Basophil % 0.6       BILIRUBIN TOTAL 0.8       BUN 13.8       Calcium 9.2       Chloride 105       CO2 22       Creatinine 0.71       eGFR >60       Eos # 0.27       Eosinophil % 2.8       Globulin, Total 2.8       Glucose 92       Hematocrit 43.7       Hemoglobin 14.5       Immature Grans (Abs) 0.08       Immature Granulocytes 0.8       Lymph # 1.78       LYMPH % 18.7       MCH 29.2       MCHC 33.2       MCV 88.1       Mono # 1.03       Mono % 10.8       MPV 11.8       Neut # 6.28       Neut % 66.3       nRBC 0.0       Platelets 236       Potassium 4.2       PROTEIN TOTAL 5.9       RBC 4.96       RDW 13.6       Sodium 136       WBC 9.5               Significant Imaging: I have reviewed all pertinent imaging results/findings within the past 24 hours.    Physical Exam  Constitutional:       General: He is not in acute distress.     Comments: Elderly appearing white male lying in bed. Appears weak and continues to speak/answer questions slowly. Likely patient's baseline unchanged form previous exams.  HENT:      Right Ear: External ear normal.      Left Ear: External ear normal.      Mouth/Throat:      Mouth: Mucous membranes are moist.   Eyes:      General: No scleral icterus.     Extraocular Movements: Extraocular movements intact.      Pupils: Pupils are equal, round, and reactive to light.   Cardiovascular:      Rate and Rhythm: Normal rate and regular rhythm.      Pulses: Normal pulses.      Heart sounds: Normal heart sounds. No murmur heard.  Pulmonary:      Effort: Pulmonary effort is normal.      Breath sounds: Normal breath sounds. No wheezing, rhonchi or rales.   Abdominal:      General: Bowel sounds are normal. There is no distension.      Palpations: Abdomen is soft. There is no mass.      Tenderness: There is no abdominal  tenderness or suprapubic tendernes.  There is no right CVA tenderness or left CVA tenderness.   Musculoskeletal:         General: No swelling.      Cervical back: Neck supple. No tenderness.      Right lower leg: No edema.      Left lower leg: No edema.   Lymphadenopathy:      Cervical: No cervical adenopathy.   Skin:     General: Skin is warm and dry.      Capillary Refill: Capillary refill takes less than 2 seconds.      Coloration: Skin is not jaundiced.      Comments: B/L nontender calves, equal in size.   Neurological:      Mental Status: He is alert and oriented to person, place, and time. 2+ DTR knee B/L; Babinski reflexes neg B/L.  3/5 strength in LLE; 4/5 RLE strength, proximal weakness notable vs distal weakness  5/5 UE strength B/L; strength continuing to improve overall in left upper and lower extremities.          Assessment/Plan:      Lacunar Infarct (Right sided)  Unilateral Left sided weakness  -NIH stroke score of 5(moderate stroke)  -MRI of brain showed an acute lacunar infarct in the right internal capsule without hemorrhage and chronic microvascular ischemic changes.  -CT lumbar spine no large disc herniation and mild neural foraminal narrowing bilaterally at L4-L5  -CXR clear without acute cardiopulmonary process identified  -Continue DAPT for 21 days then d/c plavix; continue aspirin at 81 mg daily indefinitely  -Continue Statin  -UDS negative  - Working with PT/OT who recommend rehab placement clinical  - Case management working on placement at rehab facility      Hypokalemia - resolved  -Replaced with PO Kcl   -Replete as needed         CODE STATUS: Full Code  Access: Peripheral  Antibiotics: none  Diet: Regular  GI Prophylaxis: none needed  Fluids:  None  DVT ppx: Lovenox 40 daily           Disposition: Day 4 of admission for unilateral left sided weakness with deconditioning and failure to thrive. Discharge pending rehab placement. CM aware.         Ninfa Xiao DO  Department of Hospital  Medicine   [facility] [place] [facility] Tele[facility]

## 2023-03-11 NOTE — SUBJECTIVE & OBJECTIVE
Interval History: Patient tolerating diet well with NAEON. He had no complaints this morning. Continues to use adult diapers and mentation unchanged from admission believed to be patient's baseline.    Objective:     Vital Signs (Most Recent):  Temp: 98.1 °F (36.7 °C) (03/11/23 0701)  Pulse: 88 (03/11/23 0701)  Resp: 16 (03/11/23 0701)  BP: 134/85 (03/11/23 0701)  SpO2: 95 % (03/11/23 0800) Vital Signs (24h Range):  Temp:  [97.7 °F (36.5 °C)-98.3 °F (36.8 °C)] 98.1 °F (36.7 °C)  Pulse:  [78-88] 88  Resp:  [16-18] 16  SpO2:  [94 %-97 %] 95 %  BP: (126-151)/(78-96) 134/85     Weight: 75.8 kg (167 lb)  Body mass index is 25.77 kg/m².    Intake/Output Summary (Last 24 hours) at 3/11/2023 0855  Last data filed at 3/10/2023 1752  Gross per 24 hour   Intake 950 ml   Output --   Net 950 ml      Physical Exam  Constitutional:       General: He is not in acute distress.     Comments: Elderly appearing white male lying in bed. Appears weak and continues to speak/answer questions slowly.  HENT:      Right Ear: External ear normal.      Left Ear: External ear normal.      Mouth/Throat:      Mouth: Mucous membranes are moist.   Eyes:      General: No scleral icterus.     Extraocular Movements: Extraocular movements intact.      Pupils: Pupils are equal, round, and reactive to light.   Cardiovascular:      Rate and Rhythm: Normal rate and regular rhythm.      Pulses: Normal pulses.      Heart sounds: Normal heart sounds. No murmur heard.  Pulmonary:      Effort: Pulmonary effort is normal on RA.     Breath sounds: Normal breath sounds. No wheezing, rhonchi or rales.   Abdominal:      General: Bowel sounds are normal. There is no distension.      Palpations: Abdomen is soft. There is no mass.      Tenderness: There is no abdominal tenderness or suprapubic tendernes.  There is no right CVA tenderness or left CVA tenderness.   Musculoskeletal:         General: No swelling.      Cervical back: Neck supple. No tenderness.      Right  lower leg: No edema.      Left lower leg: No edema.   Lymphadenopathy:      Cervical: No cervical adenopathy.   Skin:     General: Skin is warm and dry.      Capillary Refill: Capillary refill takes less than 2 seconds.      Coloration: Skin is not jaundiced.      Comments: B/L nontender calves, equal in size.   Neurological:      Mental Status: He is alert and oriented to person, place, and time. 2+ DTR knee B/L; Babinski reflexes neg B/L.  2+/5 strength in LLE; 3/5 RLE strength, proximal weakness notable vs distal weakness  5/5 UE strength B/L; strength improving overall in left upper and lower extremities.      Significant Labs: All pertinent labs within the past 24 hours have been reviewed.  Recent Lab Results         03/11/23  0312        Albumin/Globulin Ratio 1.1       Albumin 3.1       Alkaline Phosphatase 106       ALT 31       AST 25       Baso # 0.06       Basophil % 0.6       BILIRUBIN TOTAL 0.8       BUN 13.8       Calcium 9.2       Chloride 105       CO2 22       Creatinine 0.71       eGFR >60       Eos # 0.27       Eosinophil % 2.8       Globulin, Total 2.8       Glucose 92       Hematocrit 43.7       Hemoglobin 14.5       Immature Grans (Abs) 0.08       Immature Granulocytes 0.8       Lymph # 1.78       LYMPH % 18.7       MCH 29.2       MCHC 33.2       MCV 88.1       Mono # 1.03       Mono % 10.8       MPV 11.8       Neut # 6.28       Neut % 66.3       nRBC 0.0       Platelets 236       Potassium 4.2       PROTEIN TOTAL 5.9       RBC 4.96       RDW 13.6       Sodium 136       WBC 9.5               Significant Imaging: I have reviewed all pertinent imaging results/findings within the past 24 hours.    Physical Exam  Constitutional:       General: He is not in acute distress.     Comments: Elderly appearing white male lying in bed. Appears weak and continues to speak/answer questions slowly. Likely patient's baseline unchanged form previous exams.  HENT:      Right Ear: External ear normal.      Left  Ear: External ear normal.      Mouth/Throat:      Mouth: Mucous membranes are moist.   Eyes:      General: No scleral icterus.     Extraocular Movements: Extraocular movements intact.      Pupils: Pupils are equal, round, and reactive to light.   Cardiovascular:      Rate and Rhythm: Normal rate and regular rhythm.      Pulses: Normal pulses.      Heart sounds: Normal heart sounds. No murmur heard.  Pulmonary:      Effort: Pulmonary effort is normal.      Breath sounds: Normal breath sounds. No wheezing, rhonchi or rales.   Abdominal:      General: Bowel sounds are normal. There is no distension.      Palpations: Abdomen is soft. There is no mass.      Tenderness: There is no abdominal tenderness or suprapubic tendernes.  There is no right CVA tenderness or left CVA tenderness.   Musculoskeletal:         General: No swelling.      Cervical back: Neck supple. No tenderness.      Right lower leg: No edema.      Left lower leg: No edema.   Lymphadenopathy:      Cervical: No cervical adenopathy.   Skin:     General: Skin is warm and dry.      Capillary Refill: Capillary refill takes less than 2 seconds.      Coloration: Skin is not jaundiced.      Comments: B/L nontender calves, equal in size.   Neurological:      Mental Status: He is alert and oriented to person, place, and time. 2+ DTR knee B/L; Babinski reflexes neg B/L.  3/5 strength in LLE; 4/5 RLE strength, proximal weakness notable vs distal weakness  5/5 UE strength B/L; strength continuing to improve overall in left upper and lower extremities.

## 2023-03-12 PROCEDURE — 25000003 PHARM REV CODE 250: Performed by: STUDENT IN AN ORGANIZED HEALTH CARE EDUCATION/TRAINING PROGRAM

## 2023-03-12 PROCEDURE — 63600175 PHARM REV CODE 636 W HCPCS: Performed by: FAMILY MEDICINE

## 2023-03-12 PROCEDURE — 94761 N-INVAS EAR/PLS OXIMETRY MLT: CPT

## 2023-03-12 PROCEDURE — 11000001 HC ACUTE MED/SURG PRIVATE ROOM

## 2023-03-12 PROCEDURE — 99900035 HC TECH TIME PER 15 MIN (STAT)

## 2023-03-12 RX ADMIN — ASPIRIN 81 MG: 81 TABLET, CHEWABLE ORAL at 09:03

## 2023-03-12 RX ADMIN — CLOPIDOGREL BISULFATE 75 MG: 75 TABLET, FILM COATED ORAL at 09:03

## 2023-03-12 RX ADMIN — ATORVASTATIN CALCIUM 80 MG: 40 TABLET, FILM COATED ORAL at 08:03

## 2023-03-12 RX ADMIN — LOSARTAN POTASSIUM 25 MG: 25 TABLET, FILM COATED ORAL at 09:03

## 2023-03-12 RX ADMIN — ENOXAPARIN SODIUM 40 MG: 40 INJECTION SUBCUTANEOUS at 04:03

## 2023-03-12 NOTE — PROGRESS NOTES
[facility] Medicine Wards Progress Note     Resident Team: [facility] Medicine List 1  Attending Physician: Corinne Villalba MD    Subjective:      Brief HPI:  Geoffrey Smith is a 62 y.o. male who has not seen a physician in many years and his only past medical history is nicotine use disorder. The patient presented to [facility] ED on 3/7/2023 with a primary complaint of frequent falls. He states that he most recently fell on Monday however he denies any significant pain throughout. He reports that he is had loss of bladder and bowel continence over the past year and has required depends for several months. He states that he has not walked in the last year. He was living on his own in a trailer prior to the past week where he was able to move in with his older brother. His older brother felt as though he could not take care of him safely and had EMS pick him up to be hospitalized and seek nursing home care. Patient states that he has no complaints at this time besides the lower extremity weakness and some mild back pain.    Interval History:   NAEON, no complaints this morning. Tolerating PO intake. Currently awaiting placement to rehab facility. Denies n/v, SOB, chest pain, headaches, vision changes, worsening weakness.     Objective:     Last 24 Hour Vital Signs:  BP  Min: 119/72  Max: 134/85  Temp  Av.9 °F (36.6 °C)  Min: 97.5 °F (36.4 °C)  Max: 98.2 °F (36.8 °C)  Pulse  Av  Min: 76  Max: 90  Resp  Av  Min: 16  Max: 16  SpO2  Av.8 %  Min: 94 %  Max: 97 %  I/O last 3 completed shifts:  In:  [P.O.:]  Out: -     Physical Examination:  General: elderly appearing male in no acute distress. Appears weak, hesitates before answering questions, appears to have difficulty with concentration.   HEENT: normocephalic, atraumatic  Respiratory: CTAB. No wheezing, crackles, rales, rhonchi. Non-labored breathing on room air.  Cardiovascular: RRR, no murmurs, rubs, or gallops. No peripheral edema. No  JVD.  Gastrointestinal: soft, non-tender, non-distended. Normal bowel sounds.  Musculoskeletal: 2/5 strength LLE, 3/5 strength RLE, strength worse proximally.  Integumentary: warm, dry, intact.       Laboratory:  Most Recent Data:  CBC:   Lab Results   Component Value Date    WBC 9.5 03/11/2023    HGB 14.5 03/11/2023    HCT 43.7 03/11/2023     03/11/2023    MCV 88.1 03/11/2023    RDW 13.6 03/11/2023       BMP:   Lab Results   Component Value Date     03/11/2023    K 4.2 03/11/2023    CO2 22 (L) 03/11/2023    BUN 13.8 03/11/2023    CREATININE 0.71 (L) 03/11/2023    CALCIUM 9.2 03/11/2023    MG 2.20 03/10/2023    PHOS 2.7 03/10/2023     LFTs:   Lab Results   Component Value Date    ALBUMIN 3.1 (L) 03/11/2023    BILITOT 0.8 03/11/2023    AST 25 03/11/2023    ALKPHOS 106 03/11/2023    ALT 31 03/11/2023     Other Results:    Radiology: No new imaging over the past 24 hours, previous imaging reviewed.     Current Medications:       Scheduled:   aspirin  81 mg Oral Daily    atorvastatin  80 mg Oral QHS    clopidogreL  75 mg Oral Daily    enoxaparin  40 mg Subcutaneous Daily    losartan  25 mg Oral Daily        PRN:  albuterol, hydrALAZINE, labetalol, naloxone, sodium chloride 0.9%    Assessment & Plan:     Lacunar Infarct (Right sided)  Unilateral left sided weakness  - NIH stroke score of 5 (moderate stroke)  - MRI brain: acute lacunar infarct in the right internal capsule without hemorrhage and chronic microvascular ischemic changes.  - CT lumbar spine: no large disc herniation, mild neural foraminal narrowing bilaterally at L4-L5  - CXR without acute cardiopulmonary process identified  - Continue DAPT for 21 days then d/c plavix; continue aspirin at 81 mg qd indefinitely  - Continue Lipitor   - UDS negative  - PT/OT recs rehab   - CM working on placement at rehab facility      Hypokalemia - resolved  -Replaced with PO KCl   -Replete as needed         CODE STATUS: Full  Access: PIV  Antibiotics: none  Diet:  Regular  GI Prophylaxis: none needed  Fluids: None  DVT ppx: Lovenox        Disposition: Day 4 of admission for unilateral left sided weakness with deconditioning and failure to thrive. Discharge pending rehab placement. CM aware.    Austin Waldrop MD  [facility] - PGY-1

## 2023-03-13 LAB
ALBUMIN SERPL-MCNC: 3.2 G/DL (ref 3.4–4.8)
ALBUMIN/GLOB SERPL: 1.1 RATIO (ref 1.1–2)
ALP SERPL-CCNC: 107 UNIT/L (ref 40–150)
ALT SERPL-CCNC: 71 UNIT/L (ref 0–55)
AST SERPL-CCNC: 53 UNIT/L (ref 5–34)
BASOPHILS # BLD AUTO: 0.06 X10(3)/MCL (ref 0–0.2)
BASOPHILS NFR BLD AUTO: 0.5 %
BILIRUBIN DIRECT+TOT PNL SERPL-MCNC: 0.6 MG/DL
BUN SERPL-MCNC: 18.2 MG/DL (ref 8.4–25.7)
CALCIUM SERPL-MCNC: 9.2 MG/DL (ref 8.8–10)
CHLORIDE SERPL-SCNC: 104 MMOL/L (ref 98–107)
CO2 SERPL-SCNC: 22 MMOL/L (ref 23–31)
CREAT SERPL-MCNC: 0.76 MG/DL (ref 0.73–1.18)
EOSINOPHIL # BLD AUTO: 0.37 X10(3)/MCL (ref 0–0.9)
EOSINOPHIL NFR BLD AUTO: 3.4 %
ERYTHROCYTE [DISTWIDTH] IN BLOOD BY AUTOMATED COUNT: 13.9 % (ref 11.5–17)
GFR SERPLBLD CREATININE-BSD FMLA CKD-EPI: >60 MLS/MIN/1.73/M2
GLOBULIN SER-MCNC: 2.9 GM/DL (ref 2.4–3.5)
GLUCOSE SERPL-MCNC: 104 MG/DL (ref 82–115)
HCT VFR BLD AUTO: 45.6 % (ref 42–52)
HGB BLD-MCNC: 15.3 G/DL (ref 14–18)
IMM GRANULOCYTES # BLD AUTO: 0.08 X10(3)/MCL (ref 0–0.04)
IMM GRANULOCYTES NFR BLD AUTO: 0.7 %
LYMPHOCYTES # BLD AUTO: 1.86 X10(3)/MCL (ref 0.6–4.6)
LYMPHOCYTES NFR BLD AUTO: 16.9 %
MCH RBC QN AUTO: 29.5 PG
MCHC RBC AUTO-ENTMCNC: 33.6 G/DL (ref 33–36)
MCV RBC AUTO: 88 FL (ref 80–94)
MONOCYTES # BLD AUTO: 1.15 X10(3)/MCL (ref 0.1–1.3)
MONOCYTES NFR BLD AUTO: 10.5 %
NEUTROPHILS # BLD AUTO: 7.46 X10(3)/MCL (ref 2.1–9.2)
NEUTROPHILS NFR BLD AUTO: 68 %
NRBC BLD AUTO-RTO: 0 %
PLATELET # BLD AUTO: 290 X10(3)/MCL (ref 130–400)
PMV BLD AUTO: 11.5 FL (ref 7.4–10.4)
POTASSIUM SERPL-SCNC: 4.4 MMOL/L (ref 3.5–5.1)
PROT SERPL-MCNC: 6.1 GM/DL (ref 5.8–7.6)
RBC # BLD AUTO: 5.18 X10(6)/MCL (ref 4.7–6.1)
SODIUM SERPL-SCNC: 133 MMOL/L (ref 136–145)
WBC # SPEC AUTO: 11 X10(3)/MCL (ref 4.5–11.5)

## 2023-03-13 PROCEDURE — 99900035 HC TECH TIME PER 15 MIN (STAT)

## 2023-03-13 PROCEDURE — 97116 GAIT TRAINING THERAPY: CPT

## 2023-03-13 PROCEDURE — 97530 THERAPEUTIC ACTIVITIES: CPT

## 2023-03-13 PROCEDURE — 94761 N-INVAS EAR/PLS OXIMETRY MLT: CPT

## 2023-03-13 PROCEDURE — 11000001 HC ACUTE MED/SURG PRIVATE ROOM

## 2023-03-13 PROCEDURE — 25000003 PHARM REV CODE 250: Performed by: STUDENT IN AN ORGANIZED HEALTH CARE EDUCATION/TRAINING PROGRAM

## 2023-03-13 PROCEDURE — 63600175 PHARM REV CODE 636 W HCPCS: Performed by: FAMILY MEDICINE

## 2023-03-13 PROCEDURE — 97112 NEUROMUSCULAR REEDUCATION: CPT

## 2023-03-13 PROCEDURE — 80053 COMPREHEN METABOLIC PANEL: CPT | Performed by: FAMILY MEDICINE

## 2023-03-13 PROCEDURE — 85025 COMPLETE CBC W/AUTO DIFF WBC: CPT | Performed by: FAMILY MEDICINE

## 2023-03-13 RX ADMIN — ENOXAPARIN SODIUM 40 MG: 40 INJECTION SUBCUTANEOUS at 04:03

## 2023-03-13 RX ADMIN — ATORVASTATIN CALCIUM 80 MG: 40 TABLET, FILM COATED ORAL at 08:03

## 2023-03-13 RX ADMIN — CLOPIDOGREL BISULFATE 75 MG: 75 TABLET, FILM COATED ORAL at 09:03

## 2023-03-13 RX ADMIN — ASPIRIN 81 MG: 81 TABLET, CHEWABLE ORAL at 09:03

## 2023-03-13 NOTE — SUBJECTIVE & OBJECTIVE
Interval History: Patient had NAEON. He has no complaints this morning and is tolerating his PO diet well without nausea or vomiting. Patient in good spirits this morning and amenable to going to rehab and then to a NH afterwards. Patient denies pain, worsening weakness, headaches, diarrhea, dysuria, hematuria, or dizziness.    Objective:     Vital Signs (Most Recent):  Temp: 97.9 °F (36.6 °C) (03/13/23 0749)  Pulse: 87 (03/13/23 0749)  Resp: 18 (03/13/23 0749)  BP: 98/64 (03/13/23 0749)  SpO2: 97 % (03/13/23 0800) Vital Signs (24h Range):  Temp:  [97.7 °F (36.5 °C)-98.2 °F (36.8 °C)] 97.9 °F (36.6 °C)  Pulse:  [73-92] 87  Resp:  [18-20] 18  SpO2:  [94 %-97 %] 97 %  BP: ()/(64-97) 98/64     Weight: 75.8 kg (167 lb)  Body mass index is 25.77 kg/m².    Intake/Output Summary (Last 24 hours) at 3/13/2023 0840  Last data filed at 3/12/2023 1840  Gross per 24 hour   Intake 960 ml   Output 1700 ml   Net -740 ml      Physical Exam:  General: elderly appearing male in no acute distress. Mentation appears improved this AM compared to previous exams.  HEENT: normocephalic, atraumatic  Respiratory: CTAB. No wheezing, crackles, rales, rhonchi. Non-labored breathing on room air.  Cardiovascular: RRR, no murmurs, rubs, or gallops. No peripheral edema. No JVD.  Gastrointestinal: soft, non-tender, non-distended. Normal bowel sounds.  Musculoskeletal: 3/5 strength LLE, 4/5 strength RLE; able to left both legs off bed against resistance with early tiring of Left leg   Integumentary: warm, dry, intact.     Significant Labs: All pertinent labs within the past 24 hours have been reviewed.  Recent Lab Results         03/13/23  0416        Albumin/Globulin Ratio 1.1       Albumin 3.2       Alkaline Phosphatase 107       ALT 71       AST 53       Baso # 0.06       Basophil % 0.5       BILIRUBIN TOTAL 0.6       BUN 18.2       Calcium 9.2       Chloride 104       CO2 22       Creatinine 0.76       eGFR >60       Eos # 0.37        Eosinophil % 3.4       Globulin, Total 2.9       Glucose 104       Hematocrit 45.6       Hemoglobin 15.3       Immature Grans (Abs) 0.08       Immature Granulocytes 0.7       Lymph # 1.86       LYMPH % 16.9       MCH 29.5       MCHC 33.6       MCV 88.0       Mono # 1.15       Mono % 10.5       MPV 11.5       Neut # 7.46       Neut % 68.0       nRBC 0.0       Platelets 290       Potassium 4.4       PROTEIN TOTAL 6.1       RBC 5.18       RDW 13.9       Sodium 133       WBC 11.0               Significant Imaging: no new imaging within last 24 hrs

## 2023-03-13 NOTE — MEDICAL/APP STUDENT
[facility] Medicine Wards Progress Note     Resident Team: [facility] Medicine List 1  Attending Physician: Corinne Villalba MD      Subjective:      Brief HPI:  Mr. Smith is a 62 y.o. male with a PMH of nicotine use disorder. He has not followed with a physician in many years. Patient presented to the ED by EMS on 23 with a chief complaint of frequent falls. His most recent fall was on Monday, , but did not sustain an injury. Reports bowel and bladder incontinence for the past year and wears depends. He is able to get up and walk to the restroom on his own when he feels like he has to go. He was living alone but recently moved in with his brother who feels that he cannot take care of him safely.     Patient underwent an MRI in the ED which showed an acute lacunar infarct in the right internal capsule without hemorrhage and moderate chronic microvascular ischemic changes. He was found to be significantly weaker on the left side compared to the right. Patient was admitted for further care and to seek nursing home placement.     Interval History: Afebrile. Some HTN yesterday morning at 155/97. Otherwise, vitals WNL. Patient has no complaints today. Denies CP, SOB, and N/V. Having regular BMs and no difficulties urinating.     Review of Systems:  ROS completed and negative except as indicated above.     Objective:     Last 24 Hour Vital Signs:  BP  Min: 111/71  Max: 156/97  Temp  Av °F (36.7 °C)  Min: 97.7 °F (36.5 °C)  Max: 98.2 °F (36.8 °C)  Pulse  Av.6  Min: 73  Max: 92  Resp  Av.5  Min: 18  Max: 20  SpO2  Av.4 %  Min: 94 %  Max: 97 %  I/O last 3 completed shifts:  In: 2160 [P.O.:2160]  Out: 1700 [Urine:1700]    Physical Examination:   Physical Exam  Constitutional:       General: He is not in acute distress.     Appearance: He is not ill-appearing.      Comments: Patient appeared more alert today than he has on past exams. He was faster to answer my questions and easier to understand.    HENT:       Right Ear: External ear normal.      Left Ear: External ear normal.   Eyes:      Extraocular Movements: Extraocular movements intact.      Conjunctiva/sclera: Conjunctivae normal.      Pupils: Pupils are equal, round, and reactive to light.   Cardiovascular:      Rate and Rhythm: Normal rate and regular rhythm.      Pulses: Normal pulses.      Heart sounds: No murmur heard.    No friction rub. No gallop.   Pulmonary:      Effort: Pulmonary effort is normal. No respiratory distress.      Breath sounds: Normal breath sounds. No stridor. No wheezing.   Abdominal:      General: Abdomen is flat.      Palpations: Abdomen is soft.   Musculoskeletal:      Cervical back: Normal range of motion and neck supple.   Neurological:      Mental Status: He is alert and oriented to person, place, and time.      Comments: Decreased strength on left side. 3/5 strength in LUE and 4/5 in LLE. Sensation intact bilaterally. Reflexes 2+.        Laboratory:  Most Recent Data:  CBC:   Lab Results   Component Value Date    WBC 11.0 03/13/2023    HGB 15.3 03/13/2023    HCT 45.6 03/13/2023     03/13/2023    MCV 88.0 03/13/2023    RDW 13.9 03/13/2023     WBC Differential:   Recent Labs   Lab 03/07/23  2204 03/09/23  0412 03/11/23  0312 03/13/23  0416   WBC 9.7 7.9 9.5 11.0   HGB 15.5 14.6 14.5 15.3   HCT 46.3 44.0 43.7 45.6    207 236 290   MCV 88.4 87.6 88.1 88.0       BMP:   Lab Results   Component Value Date     (L) 03/13/2023    K 4.4 03/13/2023    CO2 22 (L) 03/13/2023    BUN 18.2 03/13/2023    CREATININE 0.76 03/13/2023    CALCIUM 9.2 03/13/2023    MG 2.20 03/10/2023    PHOS 2.7 03/10/2023     LFTs:   Lab Results   Component Value Date    ALBUMIN 3.2 (L) 03/13/2023    BILITOT 0.6 03/13/2023    AST 53 (H) 03/13/2023    ALKPHOS 107 03/13/2023    ALT 71 (H) 03/13/2023     Coags: No results found for: INR, PROTIME, PTT  FLP: No results found for: CHOL, HDL, LDLCALC, TRIG, CHOLHDL  DM:   Lab Results   Component Value Date     CREATININE 0.76 2023     Thyroid:   Lab Results   Component Value Date    TSH 1.587 2023     Anemia: No results found for: IRON, TIBC, FERRITIN, [id number], FOLATE  Cardiac: No results found for: TROPONINI, CKTOTAL, CKMB, BNP      Other Results:  EK23  Vent. Rate : 079 BPM     Atrial Rate : 079 BPM      P-R Int : 108 ms          QRS Dur : 082 ms       QT Int : 400 ms       P-R-T Axes : 047 055 040 degrees      QTc Int : 458 ms     Sinus rhythm with short OH   Otherwise normal ECG   No previous ECGs available   Confirmed by Wil Hansen MD ([id number]) on 3/8/2023 4:19:32 PM       Echo: 23  There is no evidence of intracardiac shunting.  The left ventricle is normal in size with normal systolic function.  The estimated ejection fraction is 60%.  Normal left ventricular diastolic function.  Normal right ventricular size with normal right ventricular systolic function.  Normal central venous pressure (3 mmHg).      Radiology:  CXR - 23  Impression: NO ACUTE CARDIOPULMONARY PROCESS IDENTIFIED    CT Head w/o contrast - 23  Impression:  No acute intracranial findings.  No significant discrepancy with the preliminary report.    CT Lumbar Spine w/o contrast - 23:  Impression:   No acute fracture identified.  No significant change from the [facility] interpretation.     MRI Brain w/ + w/o contrast - 23:  Impression:  1. Acute lacunar infarct in the right internal capsule without hemorrhage.  2. Moderate chronic microvascular ischemic changes.      Current Medications:     Scheduled:   aspirin  81 mg Oral Daily    atorvastatin  80 mg Oral QHS    clopidogreL  75 mg Oral Daily    enoxaparin  40 mg Subcutaneous Daily    losartan  25 mg Oral Daily        PRN:  albuterol, hydrALAZINE, labetalol, naloxone, sodium chloride 0.9%      Assessment & Plan:     1) Right-sided lacunar infarct      Left sided weakness       - NIH stroke score of 5 (moderate stroke)       - CT head w/o contrast  - 03/07: no acute intracranial findings       - CT lumbar spine w/o contrast - 03/07: no acute fracture identified, there are multilevel degenerative changes with marginal osteophytes and facet arthropathy. No large disc herniation. Mild neural foraminal narrowing bilaterally at L4-5. No paraspinal hematoma.        - MRI - 03/08: acute lacunar infarct in the right internal capsule without hemorrhage and moderate chronic microvascular ischemic changes       - ASA 81 mg QD, clopidogrel 75 mg - continue for 21 days total then d/c clopidogrel       - Atorvastatin 80 mg - continue indefinitely       - Continue working with PT/OT - recommended placement at rehab facility       - A representative from [facility] has been to see the patient and is interested in taking him. However, they are waiting for a nursing home placement after discharge from rehab.       - CBC, CMP Q48h until discharge       - Anticipated discharge today    2) Hypertension       - Losartan 25 mg QD       - PRN IV hydralazine 10 mg q4h for SBP > 220 oor DBP > 120       - PRN IV labetalol 10 mg q4h for SBP > 220 oor DBP > 120 or HR > 70       - BP elevated yesterday AM at 156/97, otherwise has remained stable    3) Hypokalemia - resolved       - potassium chloride 40 mEq x2       - replete as needed        CODE STATUS: full  Access: pIV  Antibiotics: none  Diet: regular  DVT Prophylaxis: enoxaparin 40 mg  GI Prophylaxis: none  Fluids: none      Disposition: This is day 5 of admission for Mr. Smith following a right sided lacunar infarct with residual left sided weakness. Currently working on rehab and nursing home placement, will continue to follow until placement is made. Follow with PT/OT daily. Anticipated discharge today pending placement.      Gay Romano  [employer] MS[facility]

## 2023-03-13 NOTE — PT/OT/SLP PROGRESS
Physical Therapy Treatment    Patient Name:  Geoffrey Smith   MRN:  [id number]    Recommendations:     Discharge Recommendations:  rehabilitation facility   Discharge Equipment Recommendations: bedside commode, wheelchair, walker, rolling   Equipment to be obtained for discharge:  Rolling Walker, Wheelchair, and Bedside Commode  Barriers to discharge: Level of Skilled Assistance Needed and Endurance    Assessment:     Geoffrey Smith is a 62 y.o. male admitted with a medical diagnosis of Lacunar stroke, acute.  He presents with the following impairments/functional limitations:  weakness, impaired endurance, impaired self care skills, impaired functional mobility, gait instability, impaired balance, decreased safety awareness, decreased lower extremity function, decreased upper extremity function, decreased coordination, abnormal tone, impaired coordination, impaired fine motor .    Rehab Prognosis: Good; patient would benefit from acute skilled PT services to address these deficits and reach maximum level of function.    Recent Surgery: * No surgery found *      Plan:     During this hospitalization, patient to be seen  (3-5xwk) to address the identified rehab impairments via gait training, therapeutic activities, therapeutic exercises, neuromuscular re-education and progress toward the following goals:    Plan of Care Expires:  04/06/23    Subjective     Chief Complaint: none  Patient/Family Comments/goals: to walk again  Pain/Comfort:  Pain Rating 1: 0/10  Pain Rating Post-Intervention 1: 0/10      Objective:     Communicated with nurse Milian prior to session.  Patient found supine with peripheral IV upon PT entry to room.     General Precautions: Standard, fall   Orthopedic Precautions:N/A   Braces: N/A  Respiratory Status: Room air     Functional Mobility:  Bed Mobility:     Supine to Sit: moderate assistance  Transfers:     Sit to Stand:  moderate assistance with rolling walker  Gait: Pt. Ambulated with 2 person  max assist 15 ft x 3. Therapist had to assist with L arm to prevent elbow flexion and to maintain hand on RW, max A for balance and weightshifting and min A for advancing L LE. 3rd person PT tech had wc in the back for safety.   Balance: poor    Therapist also assisted OT with her tx.       GOALS:   Multidisciplinary Problems       Physical Therapy Goals          Problem: Physical Therapy    Goal Priority Disciplines Outcome Goal Variances Interventions   Physical Therapy Goal     PT, PT/OT Ongoing, Progressing     Description: Goals to be met by: DISCHARGE     Patient will increase functional independence with mobility by performing:    -. Supine to sit with Contact Guard Assistance  -. Sit to supine with Contact Guard Assistance  -. Rolling to Left and Right with Contact Guard Assistance  -. Sit to stand transfer with Minimal Assistance x1  -. Gait  x 25 feet with Minimal Assistance using Rolling Walker  -. Wheelchair propulsion x65 feet with Minimal Assistance using bilat UE/LE  Progressing 3/13/2023                         Time Tracking:     PT Received On: 03/13/23  PT Start Time: 1004     PT Stop Time: 1059  PT Total Time (min): 55 min     Billable Minutes: Gait Training 25    Treatment Type: Treatment  PT/PTA: PT           03/13/2023

## 2023-03-13 NOTE — PT/OT/SLP PROGRESS
[facility]  SPEECH LANGUAGE PATHOLOGY  MISSED VISIT NOTE      PATIENT:  Geoffrey Smith    : 1960    MRN: [id number]    DATE: 2023          HISTORY & PHYSICAL:    Active Ambulatory Problems     Diagnosis Date Noted    No Active Ambulatory Problems     Resolved Ambulatory Problems     Diagnosis Date Noted    No Resolved Ambulatory Problems     No Additional Past Medical History             IMPRESSION:  Attempted ST session for cognitive tx. Patient resting and asked to return later. Will attempt at next opportunity.              Du Boyle M.S. CCC-SLP  [facility]

## 2023-03-13 NOTE — PT/OT/SLP PROGRESS
Physical Therapy Treatment    Patient Name:  Geoffrey Smith   MRN:  [id number]    Recommendations:     Discharge Recommendations:  rehabilitation facility   Discharge Equipment Recommendations: bedside commode, wheelchair, walker, rolling   Equipment to be obtained for discharge:  Rolling Walker, Wheelchair, and Bedside Commode  Barriers to discharge: Level of Skilled Assistance Needed    Assessment:     Geoffrey Smith is a 62 y.o. male admitted with a medical diagnosis of Lacunar stroke, acute.  He presents with the following impairments/functional limitations:  weakness, impaired endurance, impaired self care skills, impaired functional mobility, gait instability, impaired balance, decreased safety awareness, decreased lower extremity function, decreased upper extremity function, decreased coordination, abnormal tone, impaired fine motor, impaired coordination .    Rehab Prognosis: Good; patient would benefit from acute skilled PT services to address these deficits and reach maximum level of function.    Recent Surgery: * No surgery found *      Plan:     During this hospitalization, patient to be seen  (3-5xwk) to address the identified rehab impairments via gait training, therapeutic activities, therapeutic exercises, neuromuscular re-education and progress toward the following goals:    Plan of Care Expires:  04/06/23    Subjective     Chief Complaint: none  Patient/Family Comments/goals: walk again  Pain/Comfort:  Pain Rating 1: 0/10  Pain Rating Post-Intervention 1: 0/10      Objective:     Communicated with nobody prior to session, nurse Caldera called to ask for assistance with transfer.  Patient found up in chair with peripheral IV upon PT entry to room.     General Precautions: Standard, fall   Orthopedic Precautions:N/A   Braces: N/A  Respiratory Status: Room air     Functional Mobility:  Transfers:     Bed to Chair: moderate assistance with  no AD  using  Stand Pivot  Also bedmobility, scooting up in bed  with mod A, bridging with mod A  Patient left supine with all lines intact, call button in reach, and sister present and nurse notified.     GOALS:   Multidisciplinary Problems       Physical Therapy Goals          Problem: Physical Therapy    Goal Priority Disciplines Outcome Goal Variances Interventions   Physical Therapy Goal     PT, PT/OT Ongoing, Progressing     Description: Goals to be met by: DISCHARGE     Patient will increase functional independence with mobility by performing:    -. Supine to sit with Contact Guard Assistance  -. Sit to supine with Contact Guard Assistance  -. Rolling to Left and Right with Contact Guard Assistance  -. Sit to stand transfer with Minimal Assistance x1  -. Gait  x 25 feet with Minimal Assistance using Rolling Walker  -. Wheelchair propulsion x65 feet with Minimal Assistance using bilat UE/LE  Progressing 3/13/2023                         Time Tracking:     PT Received On: 03/13/23  PT Start Time: 1200     PT Stop Time: 1209  PT Total Time (min): 9 min     Billable Minutes: Therapeutic Activity 9    Treatment Type: Treatment  PT/PTA: PT           03/13/2023

## 2023-03-13 NOTE — PT/OT/SLP PROGRESS
Occupational Therapy   Treatment    Name: Geoffrey Smith  MRN: [id number]  Admitting Diagnosis:  Lacunar stroke, acute       Recommendations:     Discharge Recommendations: rehabilitation facility  Discharge Equipment Recommendations:  bedside commode, walker, rolling, wheelchair  Barriers to discharge:  Inaccessible home environment, Decreased caregiver support    Assessment:     Geoffrey Smith is a 62 y.o. male with a medical diagnosis of Lacunar stroke, acute.  He presents with flat affect and verbalizations minimal but appropriate and accurate.  Pt continues to demonstrate L lateral lean when sitting EOB despite frequent verbal and tactile cues to orient to midline, improved sit to stand and standing mobility with training today during cotx with PT.  Pt required max A to maintain WB through LUE at RW during ambulation. Performance deficits affecting function are weakness, impaired endurance, impaired sensation, impaired self care skills, impaired functional mobility, gait instability, impaired balance, impaired cognition, decreased upper extremity function, decreased lower extremity function, decreased ROM, impaired coordination, impaired fine motor.     Rehab Prognosis:  Good; patient would benefit from acute skilled OT services to address these deficits and reach maximum level of function.       Plan:     Patient to be seen 2 x/week, 5 x/week (M-F) to address the above listed problems via therapeutic exercises, therapeutic activities, self-care/home management  Plan of Care Expires:  (D/C)  Plan of Care Reviewed with: patient, sibling    Subjective     Pain/Comfort:  Pain Rating 1: 0/10    Objective:     Communicated with: nurse Milian prior to session.  Patient found HOB elevated with peripheral IV, PureWick upon OT entry to room.    General Precautions: Standard, fall    Orthopedic Precautions:   Braces:    Respiratory Status: Room air     Occupational Performance:     Bed Mobility:    Patient completed  Rolling/Turning to Left with  minimum assistance  Patient completed Scooting/Bridging with maximal assistance  Patient completed Supine to Sit with moderate assistance     Functional Mobility/Transfers:  Patient completed Sit <> Stand Transfer with minimum assistance and of 2 persons  with  rolling walker   Functional Mobility: mod A x2 using RW to ambulate in room and garcia (see PT noted for details), with max A to maintain LUE engagement at RW d/t weakness and poor endurance    Activities of Daily Living:  Grooming: minimum assistance to comb hair seated EOB, for sitting balance during task and verbal cues to initiate and to complete task  Upper Body Dressing: moderate assistance seated EOB with cues for sequencing each step      Treatment & Education:  Neuromuscular reeducation to facilitate improved motor control throughout LUE via weightbearing activities in sitting EOB, weightbearing at RW in standing, and reaching activities with LUE in both gravity-eliminated positions and against gravity while sitting EOB.    Pt's sister came to room during session and was present for updated goals and POC following good progress noted during session today.    Patient left up in chair with all lines intact, call button in reach, nurse notified, and pt's sister present    GOALS:   Multidisciplinary Problems       Occupational Therapy Goals          Problem: Occupational Therapy    Goal Priority Disciplines Outcome Interventions   Occupational Therapy Goal     OT, PT/OT Ongoing, Progressing    Description: Pt will sit EOB with SBA for balance, maintaining midline orientation with verbal cues alone for 15 min.    Patient will perform grooming with mod assist while standing at sink.    Patient will perform toileting with mod assist using BSC.    Patient will perform toilet transfer with min assist with use of RW.    Patient will perform upper body dressing with mod assist while seated EOB.                              Time  Tracking:     OT Date of Treatment: 03/13/23  OT Start Time: 1005  OT Stop Time: 1100  OT Total Time (min): 55 min    Billable Minutes:Therapeutic Activity 15  Neuromuscular Re-education 15  Total Time 55    OT/VLAD: OT          3/13/2023

## 2023-03-13 NOTE — PROGRESS NOTES
[facility] - [address] [place] [address] Medicine  Progress Note    Patient Name: Geoffrey Smith  MRN: [id number]  Patient Class: IP- Inpatient   Admission Date: 3/7/2023  Length of Stay: 5 days  Attending Physician: Corinne Villalba MD  Primary Care Provider: Primary Doctor No        Subjective:     Principal Problem:Lacunar stroke, acute        HPI:  Geoffrey Smith is a 62 y.o. male who has not seen a physician in many years and his only past medical history is nicotine use disorder. The patient presented to [facility] ED on 3/7/2023 with a primary complaint of frequent falls.  He states that he most recently fell on Monday however he denies any significant pain throughout.  He reports that he is had loss of bladder and bowel continence over the past year and has required depends for several months.  He states that he has not walked in the last year.  He was living on his own in a trailer prior to the past week where he was able to move in with his older brother.  His older brother felt as though he could not take care of him safely and had EMS picked him up to be hospitalized and seek nursing home care.  Patient states that he has no complaints at this time besides the lower extremity weakness and some mild back pain.    Interval History: Patient had NAEON. He has no complaints this morning and is tolerating his PO diet well without nausea or vomiting. Patient in good spirits this morning and amenable to going to rehab and then to a NH afterwards. Patient denies pain, worsening weakness, headaches, diarrhea, dysuria, hematuria, or dizziness.    Objective:     Vital Signs (Most Recent):  Temp: 97.9 °F (36.6 °C) (03/13/23 0749)  Pulse: 87 (03/13/23 0749)  Resp: 18 (03/13/23 0749)  BP: 98/64 (03/13/23 0749)  SpO2: 97 % (03/13/23 0800) Vital Signs (24h Range):  Temp:  [97.7 °F (36.5 °C)-98.2 °F (36.8 °C)] 97.9 °F (36.6 °C)  Pulse:  [73-92] 87  Resp:  [18-20] 18  SpO2:  [94 %-97 %] 97 %  BP: ()/(64-97)  98/64     Weight: 75.8 kg (167 lb)  Body mass index is 25.77 kg/m².    Intake/Output Summary (Last 24 hours) at 3/13/2023 0840  Last data filed at 3/12/2023 1840  Gross per 24 hour   Intake 960 ml   Output 1700 ml   Net -740 ml      Physical Exam:  General: elderly appearing male in no acute distress. Mentation appears improved this AM compared to previous exams.  HEENT: normocephalic, atraumatic  Respiratory: CTAB. No wheezing, crackles, rales, rhonchi. Non-labored breathing on room air.  Cardiovascular: RRR, no murmurs, rubs, or gallops. No peripheral edema. No JVD.  Gastrointestinal: soft, non-tender, non-distended. Normal bowel sounds.  Musculoskeletal: 3/5 strength LLE, 4/5 strength RLE; able to left both legs off bed against resistance with early tiring of Left leg   Integumentary: warm, dry, intact.     Significant Labs: All pertinent labs within the past 24 hours have been reviewed.  Recent Lab Results         03/13/23  0416        Albumin/Globulin Ratio 1.1       Albumin 3.2       Alkaline Phosphatase 107       ALT 71       AST 53       Baso # 0.06       Basophil % 0.5       BILIRUBIN TOTAL 0.6       BUN 18.2       Calcium 9.2       Chloride 104       CO2 22       Creatinine 0.76       eGFR >60       Eos # 0.37       Eosinophil % 3.4       Globulin, Total 2.9       Glucose 104       Hematocrit 45.6       Hemoglobin 15.3       Immature Grans (Abs) 0.08       Immature Granulocytes 0.7       Lymph # 1.86       LYMPH % 16.9       MCH 29.5       MCHC 33.6       MCV 88.0       Mono # 1.15       Mono % 10.5       MPV 11.5       Neut # 7.46       Neut % 68.0       nRBC 0.0       Platelets 290       Potassium 4.4       PROTEIN TOTAL 6.1       RBC 5.18       RDW 13.9       Sodium 133       WBC 11.0               Significant Imaging: no new imaging within last 24 hrs      Assessment/Plan:      Lacunar Infarct (Right sided)  Unilateral left sided weakness  - NIH stroke score of 5 (moderate stroke)  - MRI brain: acute  lacunar infarct in the right internal capsule without hemorrhage and chronic microvascular ischemic changes.  - CT lumbar spine: no large disc herniation, mild neural foraminal narrowing bilaterally at L4-L5  - CXR without acute cardiopulmonary process identified  - Continue DAPT for 21 days then d/c plavix; continue aspirin at 81 mg qd indefinitely  - Continue Lipitor   - UDS negative  - PT/OT recs rehab   - CM working on placement at rehab facility      Hypertension  - Losartan 25 mg QD  - PRN IV hydralazine 10 mg q4h for SBP > 220 oor DBP > 120  - PRN IV labetalol 10 mg q4h for SBP > 220 oor DBP > 120 or HR > 70  - BP elevated yesterday AM at 156/97, otherwise has remained stable    Hypokalemia - resolved  - Replaced with PO KCl   - Replete as needed         CODE STATUS: Full  Access: PIV  Antibiotics: none  Diet: Regular  GI Prophylaxis: none needed  Fluids: None  DVT ppx: Lovenox        Disposition: Day 5 of admission for unilateral left sided weakness with deconditioning and failure to thrive. Discharge pending rehab and poast rehab NH placement. CM aware.                  Ninfa Xiao DO  Department of Hospital Medicine   [facility] Surg Telemetry

## 2023-03-13 NOTE — PLAN OF CARE
Call placed to admissions at [facility]  in [place], P: [phone], at approximately 10:30 AM. Left message to return call regarding status of referral sent on Friday.

## 2023-03-14 LAB — SARS-COV-2 RDRP RESP QL NAA+PROBE: NEGATIVE

## 2023-03-14 PROCEDURE — 11000001 HC ACUTE MED/SURG PRIVATE ROOM

## 2023-03-14 PROCEDURE — 97535 SELF CARE MNGMENT TRAINING: CPT

## 2023-03-14 PROCEDURE — 97116 GAIT TRAINING THERAPY: CPT

## 2023-03-14 PROCEDURE — 25000003 PHARM REV CODE 250: Performed by: STUDENT IN AN ORGANIZED HEALTH CARE EDUCATION/TRAINING PROGRAM

## 2023-03-14 PROCEDURE — 99900035 HC TECH TIME PER 15 MIN (STAT)

## 2023-03-14 PROCEDURE — 94761 N-INVAS EAR/PLS OXIMETRY MLT: CPT

## 2023-03-14 PROCEDURE — 63600175 PHARM REV CODE 636 W HCPCS: Performed by: FAMILY MEDICINE

## 2023-03-14 PROCEDURE — 87635 SARS-COV-2 COVID-19 AMP PRB: CPT | Performed by: STUDENT IN AN ORGANIZED HEALTH CARE EDUCATION/TRAINING PROGRAM

## 2023-03-14 RX ADMIN — ASPIRIN 81 MG: 81 TABLET, CHEWABLE ORAL at 08:03

## 2023-03-14 RX ADMIN — ATORVASTATIN CALCIUM 80 MG: 40 TABLET, FILM COATED ORAL at 09:03

## 2023-03-14 RX ADMIN — CLOPIDOGREL BISULFATE 75 MG: 75 TABLET, FILM COATED ORAL at 08:03

## 2023-03-14 RX ADMIN — ENOXAPARIN SODIUM 40 MG: 40 INJECTION SUBCUTANEOUS at 05:03

## 2023-03-14 RX ADMIN — LOSARTAN POTASSIUM 25 MG: 25 TABLET, FILM COATED ORAL at 08:03

## 2023-03-14 NOTE — PROGRESS NOTES
"[facility] Internal Medicine Progress Note     Geoffrey Smith  [id number]  03/14/2023 8:48 AM    Chief Complaint   Patient presents with    Fall     Pt arrives via [facility] after he states he fell on Monday and is complaining of lower extremity weakness and back pain; pt also states that he has been unable to walk x1 year and lives alone       Brief HPI: Geoffrey Smith is a 62 y.o. male who has not seen a physician in many years and his only past medical history is nicotine use disorder. The patient presented to [facility] ED on 3/7/2023 with a primary complaint of frequent falls.  He states that he most recently fell on Monday however he denies any significant pain throughout.  He reports that he is had loss of bladder and bowel continence over the past year and has required depends for several months.  He states that he has not walked in the last year.  He was living on his own in a trailer prior to the past week where he was able to move in with his older brother.  His older brother felt as though he could not take care of him safely and had EMS picked him up to be hospitalized and seek nursing home care.  Patient states that he has no complaints at this time besides the lower extremity weakness and some mild back pain.    Interval History: NAEON. Patient denies complaints. Tolerating PO intake     Blood pressure 136/89, pulse 77, temperature 97.9 °F (36.6 °C), temperature source Oral, resp. rate 18, height 5' 7.5" (1.715 m), weight 75.8 kg (167 lb), SpO2 97 %.    Physical Exam  Constitutional:       Appearance: Normal appearance.   HENT:      Head: Normocephalic and atraumatic.      Mouth/Throat:      Mouth: Mucous membranes are moist.      Pharynx: Oropharynx is clear.   Eyes:      Conjunctiva/sclera: Conjunctivae normal.      Pupils: Pupils are equal, round, and reactive to light.   Cardiovascular:      Rate and Rhythm: Normal rate and regular rhythm.      Pulses: Normal pulses.      Heart sounds: Normal heart sounds. "   Pulmonary:      Effort: Pulmonary effort is normal.      Breath sounds: Normal breath sounds.   Abdominal:      General: Abdomen is flat. Bowel sounds are normal. There is no distension.      Tenderness: There is no guarding or rebound.   Musculoskeletal:      Right lower leg: No edema.      Left lower leg: No edema.      Comments: Left arm 4+/5 strength. Left leg 4+/5. Right arm 5/5. Right leg 5/5.    Skin:     General: Skin is warm and dry.      Capillary Refill: Capillary refill takes less than 2 seconds.   Neurological:      Mental Status: He is alert.      Comments: Oriented to person and situation       Labs  Lab Results   Component Value Date    WBC 11.0 03/13/2023    HGB 15.3 03/13/2023    HCT 45.6 03/13/2023    MCV 88.0 03/13/2023     03/13/2023         Lab Results   Component Value Date     (L) 03/13/2023    K 4.4 03/13/2023    CO2 22 (L) 03/13/2023    BUN 18.2 03/13/2023    CREATININE 0.76 03/13/2023    CALCIUM 9.2 03/13/2023    EGFRNORACEVR >60 03/13/2023       Micro  None    Imaging  ECHO 3/8: EF 60%  MRI 3/8: Acute lacunar infarct in right internal capsule w/o hemorrhage  CT Lumbar Spine 3/8: No acute fracture  CT Head w/o 3/8: No acute findings  CXR 3/7: No acute cardiopulmonary process      EKG   normal EKG, normal sinus rhythm, Qtc 458    Assessment and Plan  Lacunar Infarct (Right sided)  Unilateral left sided weakness  - NIH stroke score of 5 (moderate stroke)  - MRI brain: acute lacunar infarct in the right internal capsule without hemorrhage and chronic microvascular ischemic changes.  - CT lumbar spine: no large disc herniation, mild neural foraminal narrowing bilaterally at L4-L5  - CXR without acute cardiopulmonary process identified  - Continue DAPT for 21 days then d/c plavix; continue aspirin at 81 mg qd indefinitely  - Continue Lipitor   - UDS negative  - PT/OT recs rehab   - CM working on placement at rehab facility      Hypertension  - Losartan 25 mg QD  - PRN IV  hydralazine 10 mg q4h for SBP > 220 oor DBP > 120  - PRN IV labetalol 10 mg q4h for SBP > 220 oor DBP > 120 or HR > 70  - BP elevated yesterday AM at 156/97, otherwise has remained stable     Hypokalemia - resolved   - Replete as needed         CODE STATUS: Full  Access: PIV  Antibiotics: none  Diet: Regular  GI Prophylaxis: none needed  Fluids: None  DVT ppx: Lovenox        Disposition: Day 7 of admission for unilateral left sided weakness with deconditioning and failure to thrive. Discharge pending rehab and poast rehab NH placement. CM aware.    Anya Mcmillan  [facility] HO-3

## 2023-03-14 NOTE — PT/OT/SLP PROGRESS
Occupational Therapy   Treatment    Name: Geoffrey Smith  MRN: [id number]  Admitting Diagnosis:  Lacunar stroke, acute       Recommendations:     Discharge Recommendations: rehabilitation facility  Discharge Equipment Recommendations:  bedside commode, walker, rolling, wheelchair  Barriers to discharge:  Inaccessible home environment, Decreased caregiver support    Assessment:     Geoffrey Smith is a 62 y.o. male with a medical diagnosis of Lacunar stroke, acute.  He presents with flat affect and verbalizations minimal but appropriate and accurate.  Pt demonstrated improvement with midline orientation during sitting EOB, with decreased L lateral lean and able to self-correct LOB approx 2/5 times, required verbal and/or tactile cues 3/5 times, and improved sit to stand and standing mobility with training today during cotx with PT.  Pt required min A to maintain WB through LUE at RW during ambulation, however note easily fatigued and required multiple rest breaks in sitting.  Performance deficits affecting function are weakness, impaired endurance, impaired sensation, impaired self care skills, impaired functional mobility, gait instability, impaired balance, impaired cognition, decreased upper extremity function, decreased lower extremity function, decreased ROM, impaired coordination, impaired fine motor.     Rehab Prognosis:  Good; patient would benefit from acute skilled OT services to address these deficits and reach maximum level of function.       Plan:     Patient to be seen 2 x/week, 5 x/week (M-F) to address the above listed problems via therapeutic exercises, therapeutic activities, self-care/home management  Plan of Care Expires:  (D/C)  Plan of Care Reviewed with: patient, sibling    Subjective     Pain/Comfort:  Pain Rating 1: 0/10    Objective:     Communicated with: nurse Sellers prior to session.  Patient found HOB elevated with peripheral IV, PureWick upon OT entry to room.    General Precautions:  Standard, fall    Orthopedic Precautions:   Braces:    Respiratory Status: Room air     Occupational Performance:     Bed Mobility:    Patient completed Rolling/Turning to Left with  minimum assistance  Patient completed Scooting/Bridging with moderate assistance  Patient completed Supine to Sit with minimum assistance and moderate assistance     Functional Mobility/Transfers:  Patient completed Sit <> Stand Transfer with minimum assistance  with  rolling walker   Functional Mobility: mod A x2 using RW to ambulate in room and garcia (see PT noted for details), with improvement over course of session to min A to maintain LUE engagement at RW d/t weakness and poor endurance    Activities of Daily Living:  Grooming: contact guard assistance and minimum assistance to brush teeth seated EOB, for sitting balance during task and verbal cues to initiate task      Treatment & Education:  Neuromuscular reeducation to facilitate improved motor control throughout LUE via weightbearing activities in sitting EOB, weightbearing at RW in standing, and reaching activities with LUE in both gravity-eliminated positions and against gravity while sitting EOB.    Patient left up in chair with all lines intact, call button in reach, and nurse notified    GOALS:   Multidisciplinary Problems       Occupational Therapy Goals          Problem: Occupational Therapy    Goal Priority Disciplines Outcome Interventions   Occupational Therapy Goal     OT, PT/OT Ongoing, Progressing    Description: Pt will sit EOB with SBA for balance, maintaining midline orientation with verbal cues alone for 15 min.    Patient will perform grooming with mod assist while standing at sink.    Patient will perform toileting with mod assist using BSC.    Patient will perform toilet transfer with min assist with use of RW.    Patient will perform upper body dressing with mod assist while seated EOB.                              Time Tracking:     OT Date of Treatment:  03/14/23  OT Start Time: 1000  OT Stop Time: 1034  OT Total Time (min): 34 min    Billable Minutes:Self Care/Home Management 15  Total Time 34    OT/VLAD: OT          3/14/2023

## 2023-03-14 NOTE — PLAN OF CARE
Problem: Adult Inpatient Plan of Care  Goal: Plan of Care Review  Outcome: Ongoing, Progressing  Goal: Patient-Specific Goal (Individualized)  Outcome: Ongoing, Progressing  Goal: Absence of Hospital-Acquired Illness or Injury  Outcome: Ongoing, Progressing  Goal: Optimal Comfort and Wellbeing  Outcome: Ongoing, Progressing  Goal: Readiness for Transition of Care  Outcome: Ongoing, Progressing     Problem: Skin Injury Risk Increased  Goal: Skin Health and Integrity  Outcome: Ongoing, Progressing     Problem: Fall Injury Risk  Goal: Absence of Fall and Fall-Related Injury  Outcome: Ongoing, Progressing     Problem: Infection  Goal: Absence of Infection Signs and Symptoms  Outcome: Ongoing, Progressing     Problem: Adjustment to Illness (Stroke, Ischemic/Transient Ischemic Attack)  Goal: Optimal Coping  Outcome: Ongoing, Progressing     Problem: Bowel Elimination Impaired (Stroke, Ischemic/Transient Ischemic Attack)  Goal: Effective Bowel Elimination  Outcome: Ongoing, Progressing     Problem: Cerebral Tissue Perfusion (Stroke, Ischemic/Transient Ischemic Attack)  Goal: Optimal Cerebral Tissue Perfusion  Outcome: Ongoing, Progressing     Problem: Cognitive Impairment (Stroke, Ischemic/Transient Ischemic Attack)  Goal: Optimal Cognitive Function  Outcome: Ongoing, Progressing     Problem: Communication Impairment (Stroke, Ischemic/Transient Ischemic Attack)  Goal: Improved Communication Skills  Outcome: Ongoing, Progressing     Problem: Functional Ability Impaired (Stroke, Ischemic/Transient Ischemic Attack)  Goal: Optimal Functional Ability  Outcome: Ongoing, Progressing     Problem: Respiratory Compromise (Stroke, Ischemic/Transient Ischemic Attack)  Goal: Effective Oxygenation and Ventilation  Outcome: Ongoing, Progressing     Problem: Sensorimotor Impairment (Stroke, Ischemic/Transient Ischemic Attack)  Goal: Improved Sensorimotor Function  Outcome: Ongoing, Progressing     Problem: Swallowing Impairment (Stroke,  Ischemic/Transient Ischemic Attack)  Goal: Optimal Eating and Swallowing without Aspiration  Outcome: Ongoing, Progressing     Problem: Urinary Elimination Impaired (Stroke, Ischemic/Transient Ischemic Attack)  Goal: Effective Urinary Elimination  Outcome: Ongoing, Progressing

## 2023-03-14 NOTE — PLAN OF CARE
Received call from Bryan at [facility], P: [phone], stating that they have accepted patient. PASSR and Form 142 have been sent via [employer]. Sp[facility] to Cheri at [facility], P: [phone], to let her know that patient now has a DC plan. She will check with admissions to see if they can take patient today. Will follow.

## 2023-03-14 NOTE — PLAN OF CARE
Received call from Cheri at [facility] stating [facility] will be able to accept patient tomorrow. They will need a rapid covid prior to transfer. Patient and sister, Peggy, P: [phone], have been updated and are in agreement.

## 2023-03-14 NOTE — MEDICAL/APP STUDENT
[facility] Medicine Wards Progress Note     Resident Team: [facility] Medicine List 1  Attending Physician: Corinne Villalba MD      Subjective:      Brief HPI:  Mr. Smith is a 62 y.o. male with a PMH of nicotine use disorder. He has not followed with a physician in many years. Patient presented to the ED by EMS on 23 with a chief complaint of frequent falls. His most recent fall was on Monday, , but did not sustain an injury. Reports bowel and bladder incontinence for the past year and wears depends. He is able to get up and walk to the restroom on his own when he feels like he has to go. He was living alone but recently moved in with his brother who feels that he cannot take care of him safely.     Patient underwent an MRI in the ED which showed an acute lacunar infarct in the right internal capsule without hemorrhage and moderate chronic microvascular ischemic changes. He was found to be significantly weaker on the left side compared to the right. Patient was admitted for further care and to seek nursing home placement.     Interval History: Afebrile.Vitals WNL. Patient has no complaints today. Denies CP, SOB, and N/V. Having regular BMs and no difficulties urinating.     Review of Systems:  ROS completed and negative except as indicated above.     Objective:     Last 24 Hour Vital Signs:  BP  Min: 102/67  Max: 136/89  Temp  Av.1 °F (36.7 °C)  Min: 97.7 °F (36.5 °C)  Max: 99 °F (37.2 °C)  Pulse  Av.8  Min: 66  Max: 92  Resp  Av.5  Min: 18  Max: 20  SpO2  Av.3 %  Min: 95 %  Max: 97 %  I/O last 3 completed shifts:  In: 290 [P.O.:290]  Out: 400 [Urine:400]    Physical Examination:   Physical Exam  Constitutional:       General: He is not in acute distress.     Appearance: He is not ill-appearing.      Comments: Patient appeared more alert today than he has on past exams. He was faster to answer my questions and easier to understand.    HENT:      Right Ear: External ear normal.      Left Ear:  External ear normal.   Eyes:      Extraocular Movements: Extraocular movements intact.      Conjunctiva/sclera: Conjunctivae normal.      Pupils: Pupils are equal, round, and reactive to light.   Cardiovascular:      Rate and Rhythm: Normal rate and regular rhythm.      Pulses: Normal pulses.      Heart sounds: No murmur heard.    No friction rub. No gallop.   Pulmonary:      Effort: Pulmonary effort is normal. No respiratory distress.      Breath sounds: Normal breath sounds. No stridor. No wheezing.   Abdominal:      General: Abdomen is flat.      Palpations: Abdomen is soft.   Musculoskeletal:      Cervical back: Normal range of motion and neck supple.   Neurological:      Mental Status: He is alert and oriented to person, place, and time.      Comments: Decreased strength on left side. 3/5 strength in LUE and 4/5 in LLE. Sensation intact bilaterally. Reflexes 2+.        Laboratory:  Most Recent Data:  CBC:   Lab Results   Component Value Date    WBC 11.0 03/13/2023    HGB 15.3 03/13/2023    HCT 45.6 03/13/2023     03/13/2023    MCV 88.0 03/13/2023    RDW 13.9 03/13/2023     WBC Differential:   Recent Labs   Lab 03/07/23  2204 03/09/23  0412 03/11/23  0312 03/13/23  0416   WBC 9.7 7.9 9.5 11.0   HGB 15.5 14.6 14.5 15.3   HCT 46.3 44.0 43.7 45.6    207 236 290   MCV 88.4 87.6 88.1 88.0       BMP:   Lab Results   Component Value Date     (L) 03/13/2023    K 4.4 03/13/2023    CO2 22 (L) 03/13/2023    BUN 18.2 03/13/2023    CREATININE 0.76 03/13/2023    CALCIUM 9.2 03/13/2023    MG 2.20 03/10/2023    PHOS 2.7 03/10/2023     LFTs:   Lab Results   Component Value Date    ALBUMIN 3.2 (L) 03/13/2023    BILITOT 0.6 03/13/2023    AST 53 (H) 03/13/2023    ALKPHOS 107 03/13/2023    ALT 71 (H) 03/13/2023     Coags: No results found for: INR, PROTIME, PTT  FLP: No results found for: CHOL, HDL, LDLCALC, TRIG, CHOLHDL  DM:   Lab Results   Component Value Date    CREATININE 0.76 03/13/2023     Thyroid:   Lab  Results   Component Value Date    TSH 1.587 2023     Anemia: No results found for: IRON, TIBC, FERRITIN, UZRHGZPU13, FOLATE  Cardiac: No results found for: TROPONINI, CKTOTAL, CKMB, BNP      Other Results:  EK23  Vent. Rate : 079 BPM     Atrial Rate : 079 BPM      P-R Int : 108 ms          QRS Dur : 082 ms       QT Int : 400 ms       P-R-T Axes : 047 055 040 degrees      QTc Int : 458 ms     Sinus rhythm with short WA   Otherwise normal ECG   No previous ECGs available   Confirmed by Wil Hansen MD ([id number]) on 3/8/2023 4:19:32 PM       Echo: 23  There is no evidence of intracardiac shunting.  The left ventricle is normal in size with normal systolic function.  The estimated ejection fraction is 60%.  Normal left ventricular diastolic function.  Normal right ventricular size with normal right ventricular systolic function.  Normal central venous pressure (3 mmHg).      Radiology:  CXR - 23  Impression: NO ACUTE CARDIOPULMONARY PROCESS IDENTIFIED    CT Head w/o contrast - 23  Impression:  No acute intracranial findings.  No significant discrepancy with the preliminary report.    CT Lumbar Spine w/o contrast - 23:  Impression:   No acute fracture identified.  No significant change from the [facility] interpretation.     MRI Brain w/ + w/o contrast - 23:  Impression:  1. Acute lacunar infarct in the right internal capsule without hemorrhage.  2. Moderate chronic microvascular ischemic changes.      Current Medications:     Scheduled:   aspirin  81 mg Oral Daily    atorvastatin  80 mg Oral QHS    clopidogreL  75 mg Oral Daily    enoxaparin  40 mg Subcutaneous Daily    losartan  25 mg Oral Daily        PRN:  albuterol, hydrALAZINE, labetalol, naloxone, sodium chloride 0.9%      Assessment & Plan:     1) Right-sided lacunar infarct      Left sided weakness       - NIH stroke score of 5 (moderate stroke)       - CT head w/o contrast - : no acute intracranial findings        - CT lumbar spine w/o contrast - 03/07: no acute fracture identified, there are multilevel degenerative changes with marginal osteophytes and facet arthropathy. No large disc herniation. Mild neural foraminal narrowing bilaterally at L4-5. No paraspinal hematoma.        - MRI - 03/08: acute lacunar infarct in the right internal capsule without hemorrhage and moderate chronic microvascular ischemic changes       - ASA 81 mg QD, clopidogrel 75 mg - continue for 21 days total then d/c clopidogrel       - Atorvastatin 80 mg - continue indefinitely       - Continue working with PT/OT - recommended placement at rehab facility       - A representative from [facility] has been to see the patient and is interested in taking him. However, they are waiting for a nursing home placement after discharge from rehab.       - Case management placed a called to [facility] in [place] yesterday and left a message, awaiting their call back       - CBC, CMP Q48h until discharge       - Discharge once placement is established    2) Hypertension       - Losartan 25 mg QD       - PRN IV hydralazine 10 mg q4h for SBP > 220 oor DBP > 120       - PRN IV labetalol 10 mg q4h for SBP > 220 oor DBP > 120 or HR > 70       - BP elevated yesterday AM at 156/97, otherwise has remained stable    3) Hypokalemia - resolved       - potassium chloride 40 mEq x2       - replete as needed        CODE STATUS: full  Access: pIV  Antibiotics: none  Diet: regular  DVT Prophylaxis: enoxaparin 40 mg  GI Prophylaxis: none  Fluids: none      Disposition: This is day 6 of admission for Mr. Smith following a right sided lacunar infarct with residual left sided weakness. Currently working on rehab and nursing home placement, will continue to follow until placement is made. Follow with PT/OT daily. Stable for discharge pending placement.      Gay Romano  U MS3

## 2023-03-14 NOTE — PT/OT/SLP PROGRESS
Physical Therapy Treatment    Patient Name:  Geoffrey Smith   MRN:  [id number]    Recommendations:     Discharge Recommendations:  rehabilitation facility   Discharge Equipment Recommendations: bedside commode, wheelchair, walker, rolling   Equipment to be obtained for discharge:  Rolling Walker, Wheelchair, and Bedside Commode  Barriers to discharge: Level of Skilled Assistance Needed and Endurance    Assessment:     Geoffrey Smith is a 62 y.o. male admitted with a medical diagnosis of Lacunar stroke, acute.  He presents with the following impairments/functional limitations:  weakness, impaired endurance, impaired self care skills, impaired functional mobility, gait instability, impaired balance, decreased safety awareness, decreased lower extremity function, decreased upper extremity function, decreased coordination, abnormal tone .    Rehab Prognosis: Good; patient would benefit from acute skilled PT services to address these deficits and reach maximum level of function.    Recent Surgery: * No surgery found *      Plan:     During this hospitalization, patient to be seen  (3-5xwk) to address the identified rehab impairments via gait training, therapeutic activities, therapeutic exercises, neuromuscular re-education and progress toward the following goals:    Plan of Care Expires:  04/06/23    Subjective     Chief Complaint: none  Patient/Family Comments/goals: get better and walk and move his hand  Pain/Comfort:  Pain Rating 1: 0/10      Objective:     Communicated with nurse Sellers prior to session.  Patient found supine with peripheral IV upon PT entry to room.     General Precautions: Standard, fall   Orthopedic Precautions:N/A   Braces:    Respiratory Status: Room air     Functional Mobility:  Bed Mobility:     Supine to Sit: moderate assistance  Transfers:     Sit to Stand:  moderate assistance with rolling walker  Gait: Pt. Requires max A x 2 for ambulating 15 ft. Pt. Performed x 3 reps with  long  resting  periods. Main assist needed for balance and L knee extension.    Patient left up in chair with all lines intact, call button in reach, and [facility] notified..    GOALS:   Multidisciplinary Problems       Physical Therapy Goals          Problem: Physical Therapy    Goal Priority Disciplines Outcome Goal Variances Interventions   Physical Therapy Goal     PT, PT/OT Ongoing, Progressing     Description: Goals to be met by: DISCHARGE     Patient will increase functional independence with mobility by performing:    -. Supine to sit with Contact Guard Assistance  -. Sit to supine with Contact Guard Assistance  -. Rolling to Left and Right with Contact Guard Assistance  -. Sit to stand transfer with Minimal Assistance x1  -. Gait  x 25 feet with Minimal Assistance using Rolling Walker  -. Wheelchair propulsion x65 feet with Minimal Assistance using bilat UE/LE  Progressing 3/14/2023                         Time Tracking:     PT Received On: 03/14/23  PT Start Time: 1001     PT Stop Time: 1034  PT Total Time (min): 33 min     Billable Minutes: Gait Training 18    Treatment Type: Treatment  PT/PTA: PT           03/14/2023

## 2023-03-15 VITALS
HEART RATE: 71 BPM | HEIGHT: 68 IN | BODY MASS INDEX: 25.31 KG/M2 | WEIGHT: 167 LBS | RESPIRATION RATE: 20 BRPM | OXYGEN SATURATION: 98 % | SYSTOLIC BLOOD PRESSURE: 117 MMHG | TEMPERATURE: 98 F | DIASTOLIC BLOOD PRESSURE: 72 MMHG

## 2023-03-15 LAB
ALBUMIN SERPL-MCNC: 3.7 G/DL (ref 3.4–4.8)
ALBUMIN/GLOB SERPL: 1.2 RATIO (ref 1.1–2)
ALP SERPL-CCNC: 118 UNIT/L (ref 40–150)
ALT SERPL-CCNC: 98 UNIT/L (ref 0–55)
AST SERPL-CCNC: 63 UNIT/L (ref 5–34)
BASOPHILS # BLD AUTO: 0.06 X10(3)/MCL (ref 0–0.2)
BASOPHILS NFR BLD AUTO: 0.6 %
BILIRUBIN DIRECT+TOT PNL SERPL-MCNC: 0.9 MG/DL
BUN SERPL-MCNC: 13.8 MG/DL (ref 8.4–25.7)
CALCIUM SERPL-MCNC: 9.6 MG/DL (ref 8.8–10)
CHLORIDE SERPL-SCNC: 104 MMOL/L (ref 98–107)
CO2 SERPL-SCNC: 25 MMOL/L (ref 23–31)
CREAT SERPL-MCNC: 0.77 MG/DL (ref 0.73–1.18)
EOSINOPHIL # BLD AUTO: 0.31 X10(3)/MCL (ref 0–0.9)
EOSINOPHIL NFR BLD AUTO: 3 %
ERYTHROCYTE [DISTWIDTH] IN BLOOD BY AUTOMATED COUNT: 14.1 % (ref 11.5–17)
GFR SERPLBLD CREATININE-BSD FMLA CKD-EPI: >60 MLS/MIN/1.73/M2
GLOBULIN SER-MCNC: 3.1 GM/DL (ref 2.4–3.5)
GLUCOSE SERPL-MCNC: 84 MG/DL (ref 82–115)
HCT VFR BLD AUTO: 47.2 % (ref 42–52)
HGB BLD-MCNC: 15.4 G/DL (ref 14–18)
IMM GRANULOCYTES # BLD AUTO: 0.06 X10(3)/MCL (ref 0–0.04)
IMM GRANULOCYTES NFR BLD AUTO: 0.6 %
LYMPHOCYTES # BLD AUTO: 1.57 X10(3)/MCL (ref 0.6–4.6)
LYMPHOCYTES NFR BLD AUTO: 15.2 %
MCH RBC QN AUTO: 29.6 PG
MCHC RBC AUTO-ENTMCNC: 32.6 G/DL (ref 33–36)
MCV RBC AUTO: 90.6 FL (ref 80–94)
MONOCYTES # BLD AUTO: 1.01 X10(3)/MCL (ref 0.1–1.3)
MONOCYTES NFR BLD AUTO: 9.8 %
NEUTROPHILS # BLD AUTO: 7.32 X10(3)/MCL (ref 2.1–9.2)
NEUTROPHILS NFR BLD AUTO: 70.8 %
NRBC BLD AUTO-RTO: 0 %
PLATELET # BLD AUTO: 269 X10(3)/MCL (ref 130–400)
PMV BLD AUTO: 11.4 FL (ref 7.4–10.4)
POTASSIUM SERPL-SCNC: 4.4 MMOL/L (ref 3.5–5.1)
PROT SERPL-MCNC: 6.8 GM/DL (ref 5.8–7.6)
RBC # BLD AUTO: 5.21 X10(6)/MCL (ref 4.7–6.1)
SODIUM SERPL-SCNC: 140 MMOL/L (ref 136–145)
WBC # SPEC AUTO: 10.3 X10(3)/MCL (ref 4.5–11.5)

## 2023-03-15 PROCEDURE — 85025 COMPLETE CBC W/AUTO DIFF WBC: CPT | Performed by: FAMILY MEDICINE

## 2023-03-15 PROCEDURE — 97112 NEUROMUSCULAR REEDUCATION: CPT

## 2023-03-15 PROCEDURE — 99900035 HC TECH TIME PER 15 MIN (STAT)

## 2023-03-15 PROCEDURE — 80053 COMPREHEN METABOLIC PANEL: CPT | Performed by: FAMILY MEDICINE

## 2023-03-15 PROCEDURE — 97116 GAIT TRAINING THERAPY: CPT

## 2023-03-15 PROCEDURE — 94761 N-INVAS EAR/PLS OXIMETRY MLT: CPT

## 2023-03-15 PROCEDURE — 25000003 PHARM REV CODE 250: Performed by: STUDENT IN AN ORGANIZED HEALTH CARE EDUCATION/TRAINING PROGRAM

## 2023-03-15 RX ORDER — CLOPIDOGREL BISULFATE 75 MG/1
75 TABLET ORAL DAILY
Qty: 30 TABLET | Refills: 11
Start: 2023-03-15 | End: 2023-03-15 | Stop reason: SDUPTHER

## 2023-03-15 RX ORDER — NAPROXEN SODIUM 220 MG/1
81 TABLET, FILM COATED ORAL DAILY
Refills: 0
Start: 2023-03-15 | End: 2024-03-14

## 2023-03-15 RX ORDER — LOSARTAN POTASSIUM 25 MG/1
25 TABLET ORAL DAILY
Qty: 90 TABLET | Refills: 3
Start: 2023-03-15 | End: 2024-03-14

## 2023-03-15 RX ORDER — CLOPIDOGREL BISULFATE 75 MG/1
75 TABLET ORAL DAILY
Qty: 15 TABLET | Refills: 0
Start: 2023-03-15 | End: 2023-03-30

## 2023-03-15 RX ORDER — ATORVASTATIN CALCIUM 80 MG/1
80 TABLET, FILM COATED ORAL NIGHTLY
Qty: 90 TABLET | Refills: 3
Start: 2023-03-15 | End: 2024-03-14

## 2023-03-15 RX ADMIN — CLOPIDOGREL BISULFATE 75 MG: 75 TABLET, FILM COATED ORAL at 09:03

## 2023-03-15 RX ADMIN — LOSARTAN POTASSIUM 25 MG: 25 TABLET, FILM COATED ORAL at 09:03

## 2023-03-15 RX ADMIN — ASPIRIN 81 MG: 81 TABLET, CHEWABLE ORAL at 09:03

## 2023-03-15 NOTE — PT/OT/SLP PROGRESS
Physical Therapy Treatment Note  and Discharge Summary    Name: Geoffrey Smith  MRN: [id number]   Principal Problem: Lacunar stroke, acute       Recommendations:     Discharge Recommendations:  rehabilitation facility  Discharge Equipment Recommendations:  bedside commode, wheelchair, walker, rolling  Barriers to discharge: None    Subjective     Chief Complaint: being tired  Patient/Family Comments/goals: getting better  Pain/Comfort:   Pain Rating 1: 0/10      Objective:     Communicated with nurse Marj prior to session.  Patient found supine with peripheral IV upon PT entry to room.     General Precautions: Standard, fall   Orthopedic Precautions:N/A   Braces:    Respiratory Status: Room air     Functional Mobility:  Transfers:     Sit to Stand:  minimum to moderate assistance with rolling walker  Gait: Pt. Had increased tone today and started to cross L foot across midline at times when ambulating 10 ft x 3. Pt. Needed mod A x 2 person, wc in back. L knee remained flexed today, which is different from yesterday. Pt. Required more cues in order to make a step and needed assist from therapist to advance L foot.     Patient left supine in bed with call button in reach. Handoff to nurse. Bed alarm on      Time Tracking:     PT Received On: 03/15/23  PT Start Time: 1021     PT Stop Time: 1033  PT Total Time (min): 12 min       Billable Minutes: Gait Training 12       Patient Discharged from acute Physical Therapy on 3/15/2023     Assessment:     1. Ambulatory dysfunction    2. Generalized weakness    3. Chest pain    4. Stroke      Pt. Is progressing in therapy, but goals were not met yet.    Objective:     GOALS:   Multidisciplinary Problems       Physical Therapy Goals          Problem: Physical Therapy    Goal Priority Disciplines Outcome Goal Variances Interventions   Physical Therapy Goal     PT, PT/OT Ongoing, Progressing     Description: Goals to be met by: DISCHARGE     Patient will increase functional  independence with mobility by performing:    -. Supine to sit with Contact Guard Assistance  -. Sit to supine with Contact Guard Assistance  -. Rolling to Left and Right with Contact Guard Assistance  -. Sit to stand transfer with Minimal Assistance x1  -. Gait  x 25 feet with Minimal Assistance using Rolling Walker  -. Wheelchair propulsion x65 feet with Minimal Assistance using bilat UE/LE  Progressing 3/14/2023 goals not met by DC 3/15/2023                             Reasons for Discontinuation of Therapy Services  Transfer to alternate level of care.      Plan:     Patient Discharged to: Inpatient Rehab.      3/15/2023

## 2023-03-15 NOTE — PLAN OF CARE
Received message from [facility] at [facility] stating that patient is approved for today and she will have report info around 11:00.

## 2023-03-15 NOTE — DISCHARGE SUMMARY
[facility] Medicine  Discharge Summary      Patient Name: Geoffrey Smiht  MRN: [id number]  [facility]: [id number]  Patient Class: IP- Inpatient  Admission Date: 3/7/2023  Hospital Length of Stay: 7 days  Discharge Date and Time:  03/15/2023 8:35 AM  Attending Physician: Corinne Villalba MD   Discharging Provider: Anya Mcmillan MD  Primary Care Provider: Primary Doctor No    Primary Care Team: Networked reference to record PCT     HPI:   Geoffrey Smith is a 62 y.o. male who has not seen a physician in many years and his only past medical history is nicotine use disorder. The patient presented to [facility] ED on 3/7/2023 with a primary complaint of frequent falls.  He states that he most recently fell on Monday however he denies any significant pain throughout.  He reports that he is had loss of bladder and bowel continence over the past year and has required depends for several months.  He states that he has not walked in the last year.  He was living on his own in a trailer prior to the past week where he was able to move in with his older brother.  His older brother felt as though he could not take care of him safely and had EMS picked him up to be hospitalized and seek nursing home care.  Patient states that he has no complaints at this time besides the lower extremity weakness and some mild back pain.      * No surgery found *      Hospital Course:   CT head w/o contrast without acute findings. MRI brain with acute lacunar infart right internal capsule. Patient initiated on atorvastatin, 21 days DAPT. ECHO without intracardiac shunt.  Hypertension over course of hospitalization, initiated on losartan 25 mg. PT and OT evaluation recommending Rehab stay. Patient and family elect for nursing home placement following rehab stay. Patient accepted at [facility], then to transition to [facility]. Patient deemed stable for discharge.     Goals of Care Treatment  Preferences:  Code Status: Full Code      Consults:   Consults (From admission, onward)          Status Ordering Provider     Inpatient consult to Social Work/Case Management  Once        Provider:  (Not yet assigned)    Completed DIANNA KUO     Inpatient consult to Social Work/Case Management  Once        Provider:  (Not yet assigned)    Completed VINCE ALBARADO     Inpatient consult to Registered Dietitian/Nutritionist  Once        Provider:  (Not yet assigned)    Completed ABRAHAM MORAN     Inpatient consult to Hospitalist  Once        Provider:  Abraham Moran MD    Acknowledged BINA BOOGIE            No new Assessment & Plan notes have been filed under this hospital service since the last note was generated.  Service: Hospital Medicine    Final Active Diagnoses:    Diagnosis Date Noted POA    PRINCIPAL PROBLEM:  Lacunar stroke, acute [I63.81] 03/08/2023 Yes    Weakness of left lower extremity [R29.898] 03/08/2023 Yes    Hypertension [I10] 03/08/2023 Yes    Failure to thrive in adult [R62.7] 03/08/2023 Yes      Problems Resolved During this Admission:       Discharged Condition: good    Disposition: Rehab Facility    Follow Up:    Patient Instructions:   No discharge procedures on file.    Discharge Physical Exam  Constitutional:       Appearance: Normal appearance.   HENT:      Head: Normocephalic and atraumatic.      Mouth/Throat:      Mouth: Mucous membranes are moist.      Pharynx: Oropharynx is clear.   Eyes:      Conjunctiva/sclera: Conjunctivae normal.      Pupils: Pupils are equal, round, and reactive to light.   Cardiovascular:      Rate and Rhythm: Normal rate and regular rhythm.      Pulses: Normal pulses.      Heart sounds: Normal heart sounds.   Pulmonary:      Effort: Pulmonary effort is normal.      Breath sounds: Normal breath sounds.   Abdominal:      General: Abdomen is flat. Bowel sounds are normal. There is no distension.      Tenderness: There is no guarding or rebound.    Musculoskeletal:      Right lower leg: No edema.      Left lower leg: No edema.      Comments: Left arm 4+/5 strength. Left leg 4+/5. Right arm 5/5. Right leg 5/5.    Skin:     General: Skin is warm and dry.      Capillary Refill: Capillary refill takes less than 2 seconds.   Neurological:      Mental Status: He is alert.      Comments: Oriented to person and situation     Significant Diagnostic Studies: Labs:   CMP   Recent Labs   Lab 03/15/23  0511      K 4.4   CO2 25   BUN 13.8   CREATININE 0.77   CALCIUM 9.6   ALBUMIN 3.7   BILITOT 0.9   ALKPHOS 118   AST 63*   ALT 98*   , CBC   Recent Labs   Lab 03/15/23  0510   WBC 10.3   HGB 15.4   HCT 47.2       and All labs within the past 24 hours have been reviewed  Radiology: MRI: Acute lacunar stroke of right internal capsule    Pending Diagnostic Studies:       Procedure Component Value Units Date/Time    EXTRA TUBES [[id number]] Collected: 03/15/23 0510    Order Status: Sent Lab Status: In process Updated: 03/15/23 0603    Specimen: Blood, Venous     Narrative:      The following orders were created for panel order EXTRA TUBES.  Procedure                               Abnormality         Status                     ---------                               -----------         ------                     Gold Top Hold[[id number]]                                    In process                   Please view results for these tests on the individual orders.    EXTRA TUBES [[id number]] Collected: 03/11/23 0435    Order Status: Sent Lab Status: In process Updated: 03/11/23 0435    Specimen: Blood, Venous     Narrative:      The following orders were created for panel order EXTRA TUBES.  Procedure                               Abnormality         Status                     ---------                               -----------         ------                     Gold Top Hold[[id number]]                                    In process                   Please view results  for these tests on the individual orders.    EXTRA TUBES [[id number]] Collected: 03/10/23 0655    Order Status: Sent Lab Status: In process Updated: 03/10/23 0655    Specimen: Blood, Venous     Narrative:      The following orders were created for panel order EXTRA TUBES.  Procedure                               Abnormality         Status                     ---------                               -----------         ------                     Gold Top Hold[[id number]]                                    In process                   Please view results for these tests on the individual orders.    EXTRA TUBES [[id number]] Collected: 03/08/23 0610    Order Status: Sent Lab Status: In process Updated: 03/08/23 0610    Specimen: Blood, Venous     Narrative:      The following orders were created for panel order EXTRA TUBES.  Procedure                               Abnormality         Status                     ---------                               -----------         ------                     Lavender Top Hold[[id number]]                                In process                   Please view results for these tests on the individual orders.    EXTRA TUBES [[id number]] Collected: 03/07/23 2208    Order Status: Sent Lab Status: In process Updated: 03/07/23 2208    Specimen: Blood, Venous     Narrative:      The following orders were created for panel order EXTRA TUBES.  Procedure                               Abnormality         Status                     ---------                               -----------         ------                     Light Blue Top Hold[[id number]]                              In process                   Please view results for these tests on the individual orders.           Medications:  Reconciled Home Medications:      Medication List        START taking these medications      aspirin 81 MG Chew  Take 1 tablet (81 mg total) by mouth once daily.     atorvastatin 80 MG tablet  Commonly known as:  LIPITOR  Take 1 tablet (80 mg total) by mouth every evening.     clopidogreL 75 mg tablet  Commonly known as: PLAVIX  Take 1 tablet (75 mg total) by mouth once daily. for 15 days     losartan 25 MG tablet  Commonly known as: COZAAR  Take 1 tablet (25 mg total) by mouth once daily.              Indwelling Lines/Drains at time of discharge:   Lines/Drains/Airways       None                   Time spent on the discharge of patient: >35  minutes         Anya Mcmillan MD  Department of Hospital Medicine  [facility] - [address] [place] [address] Telemetry

## 2023-03-15 NOTE — PLAN OF CARE
03/15/23 1137   Medicare Message   Important Message from Medicare regarding Discharge Appeal Rights Given to patient/caregiver;Explained to patient/caregiver;Signed/date by patient/caregiver   Date IMM was signed 03/15/23   Time IMM was signed [id number]

## 2023-03-15 NOTE — PROGRESS NOTES
Inpatient Nutrition Evaluation    Admit Date: 3/7/2023   Total duration of encounter: 8 days    Nutrition Recommendation/Prescription     Continue regular bite size/chopped diet  Continue chocolate boost tid--240kcal; 10 gm protein per serving  MVI/fe  Biweekly wt  Will monitor nutrition status     Nutrition Assessment     Chart Review    Reason Seen: continuous nutrition monitoring and physician consult for FTT    Malnutrition Screening Tool Results   Have you recently lost weight without trying?: No  Have you been eating poorly because of a decreased appetite?: No   MST Score: 0     Diagnosis:  Bladder/bowel incontinence, back pain, unilateral weakness, deconditioning, HTN, nicotine disorder, microvascular cerebral atrophy, acute CVA    Relevant Medical History: smoker     Nutrition-Related Medications: Kcl ; aspirin, atorvastatin, losartan     Nutrition-Related Labs:  ((3-7) H/H 15.5/46.3 Gluc 95 Bun 5.8(L) Cr 0.6(L) K 3.0(L) Alb 3.2(L)   (3-15) H/H 15.4/47.2 Gluc 84 Bun 13.8 cr 0.7 P 2.7 K 4.4 AST 63(H) ALT 98 (H)     Diet Order: Diet Adult Regular  Oral Supplement Order: none  Appetite/Oral Intake: good/% of meals  Factors Affecting Nutritional Intake:  weakness   Food/Jain/Cultural Preferences: none reported  Food Allergies: none reported       Wound(s):   none reported     Comments  (3/15) Pt reported eating well; appetite good; likes boost; no new wt. Labs acknowledged. Pt to be transferred to Rehab center today. Current diet tx appropriate.     (3/8) Received consult FTT; MRI report--+ acute CVA; pt with no past medical Hx except smoker. Pt admitted with weakness/? Inablility to walk--per H&P no walk for year; when I asked pt about mobility--he stated he could walk to truck to go shop at store for food; ? Some confusion with answer 2 new CVA. Nurse--reported pt ate 100% breakfast meal with out difficulty. ? Need ST swallow eval if any difficulty noted. Labs acknowledged. Will order oral  "supplement for added nutrition. Pt reported no wt loss .     Anthropometrics   "  Height: 5' 7.5" (171.5 cm)    Last Weight: 75.8 kg (167 lb) (23) Weight Method: Bed Scale  BMI (Calculated): 25.8IBW 148#  #  BMI 26.1   BMI Classification: overweight (BMI 25-29.9)          Usual Body Weight (UBW), k.2 kg  % Usual Body Weight: 100.16     Usual Weight Provided By: patient    Wt Readings from Last 3 Encounters:   23 75.8 kg (167 lb)   23 75.8 kg (167 lb)   23 76.2 kg (167 lb 14.4 oz)      Weight Change(s) Since Admission:  Admit Weight: 76.2 kg (167 lb 14.4 oz) (23)  Pt stated UBW 76.2kg; ? Any wt change     Patient Education    Not applicable.    Monitoring & Evaluation     Dietitian will monitor food and beverage intake and weight.  Nutrition Risk/Follow-Up: low (follow-up in 5-7 days)  Patients assigned 'low nutrition risk' status do not qualify for a full nutritional assessment but will be monitored and re-evaluated in a 5-7 day time period. Please consult if re-evaluation needed sooner.    "

## 2023-03-15 NOTE — PLAN OF CARE
Mimi from [facility] is here to see patient. She stated that van will be her to  patient after 12. Nurse to call report to is Hoda, P: [phone]. Accepting MD is Dr. Marc. All information has been provided to patient's nurse, Marj. Patient's sister Peggy, P: [phone], has been updated.

## 2023-03-15 NOTE — PT/OT/SLP PROGRESS
Occupational Therapy   Treatment    Name: Geoffrey Smith  MRN: [id number]  Admitting Diagnosis:  Lacunar stroke, acute     Patient Active Problem List   Diagnosis    Weakness of left lower extremity    Hypertension    Failure to thrive in adult    Lacunar stroke, acute        Recommendations:     Discharge Recommendations: rehabilitation facility  Discharge Equipment Recommendations:  bedside commode, walker, rolling, wheelchair  Barriers to discharge:  Inaccessible home environment, Decreased caregiver support    Assessment:     Geoffrey Smith is a 62 y.o. male with a medical diagnosis of Lacunar stroke, acute.  He presents with functional decline. Performance deficits affecting function are weakness, impaired endurance, impaired self care skills, impaired functional mobility, gait instability, impaired balance, decreased upper extremity function, decreased lower extremity function, decreased safety awareness, abnormal tone, decreased ROM, impaired coordination, impaired fine motor.     Pt with flat affect, limited initiation of verbalizations, limited attention ,easily distracted and slow processing of information and  Increased time for motor responses. Improved tolerance  sitting EOB and aware of leaning to left with fluctuating ability to assume midline without assist. Increased L LE adductor tone noted in sitting and standing tasks impacting alignment .    Rehab Prognosis:  Good; patient would benefit from acute skilled OT services to address these deficits and reach maximum level of function.       Plan:     Patient to be seen  (Mon-Fri 2-5 times per week) to address the above listed problems via self-care/home management, neuromuscular re-education, therapeutic activities, therapeutic exercises  Plan of Care Expires:  (D/C)  Plan of Care Reviewed with: patient    Subjective     Pain/Comfort:  Pain Rating 1: 0/10  Pain Rating Post-Intervention 1: 0/10    Objective:     Communicated with: Danny Mcmahan prior to  session.  Patient found HOB elevated with PureWick, peripheral IV , bed alarm upon OT entry to room.    General Precautions: Standard, fall    Orthopedic Precautions:N/A  Braces: N/A  Respiratory Status: Room air     Occupational Performance:     Bed Mobility:    Patient completed Scooting with moderate assistance EOB   Patient completed Supine to Sit with minimum assistance  Patient completed Sit to Supine with minimum assistance     Functional Mobility/Transfers:  Patient completed Sit <> Stand Transfer with minimum assistance and    with  rolling walker   Functional Mobility: side step EOB with RW with difficulty  with WS to R as leaning to left and min A L UE WB and alignment  on RW ;  overall , mod A with RW side step x 4 to R and max A with RW side step  x 4 steps to left as poor ability to abd L LE as increased L LE adductor tone.     ADL  Pt stood with RW and B UE support and min assist balance and total A for  OT to change soiled diaper due to malfunction of purewick and provide hygiene       Treatment & Education:  Neuromuscular reeducation to facilitate motor control and alignment with WB L UE and L LE  in sitting EOB and in standing with RW and functional reaching and grasp release tasks.  Pt tolerated ~ 30 min sitting EOB prior to c/o of fatigue . Occasionally able to self correct to midline in sitting due to left lateral lean     Patient left HOB elevated with bed alarm ,  all lines intact, call button in reach, and nurse ta  notified; purewick out with soiled diaper  and nurse to replace. Pt instructed to call for assist with urinal until purewick replaced. Pt verbalized understanding.     GOALS:   Multidisciplinary Problems       Occupational Therapy Goals          Problem: Occupational Therapy    Goal Priority Disciplines Outcome Interventions   Occupational Therapy Goal     OT, PT/OT Ongoing, Progressing    Description: Pt will sit EOB with SBA for balance, maintaining midline orientation with  verbal cues alone for 15 min.    Patient will perform grooming with mod assist while standing at sink.    Patient will perform toileting with mod assist using BSC.    Patient will perform toilet transfer with min assist with use of RW.    Patient will perform upper body dressing with mod assist while seated EOB.                              Time Tracking:     OT Date of Treatment: 03/15/23  OT Start Time: 0909  OT Stop Time: 0954  OT Total Time (min): 45 min    Billable Minutes:Neuromuscular Re-education 45 min    OT/VLAD: OT          3/15/2023

## 2023-03-15 NOTE — PLAN OF CARE
Updated clinicals, vitals, labs, negative covid result, and DC summary have been sent to [facility] via [facility].

## 2023-03-16 NOTE — PT/OT/SLP DISCHARGE
Post d/c documentation.     Documenting OT did not evaluate/treat patient.  Evaluating OT not available to complete discharge summary.     Occupational Therapy Discharge Summary    Geoffrey Smith  MRN: [id number]   Principal Problem: Lacunar stroke, acute    Patient Active Problem List   Diagnosis    Weakness of left lower extremity    Hypertension    Failure to thrive in adult    Lacunar stroke, acute         Patient Discharged from acute Occupational Therapy.  Please refer to prior OT note  for functional status.    Assessment:      Patient has not met goals.    Objective:     GOALS:   Multidisciplinary Problems       Occupational Therapy Goals          Problem: Occupational Therapy    Goal Priority Disciplines Outcome Interventions   Occupational Therapy Goal     OT, PT/OT Goals not met.    Description: Pt will sit EOB with SBA for balance, maintaining midline orientation with verbal cues alone for 15 min.    Patient will perform grooming with mod assist while standing at sink.    Patient will perform toileting with mod assist using BSC.    Patient will perform toilet transfer with min assist with use of RW.    Patient will perform upper body dressing with mod assist while seated EOB.                              Reasons for Discontinuation of Therapy Services  Transfer to alternate level of care.      Plan:     Patient Discharged to:  rehab facility    3/16/2023

## 2023-10-24 ENCOUNTER — LAB REQUISITION (OUTPATIENT)
Dept: LAB | Facility: HOSPITAL | Age: 63
End: 2023-10-24
Payer: MEDICARE

## 2023-10-24 DIAGNOSIS — E78.5 HYPERLIPIDEMIA, UNSPECIFIED: ICD-10-CM

## 2023-10-24 DIAGNOSIS — I10 ESSENTIAL (PRIMARY) HYPERTENSION: ICD-10-CM

## 2023-10-24 LAB
ALBUMIN SERPL-MCNC: 3.6 G/DL (ref 3.4–4.8)
ALBUMIN/GLOB SERPL: 1.7 RATIO (ref 1.1–2)
ALP SERPL-CCNC: 101 UNIT/L (ref 40–150)
ALT SERPL-CCNC: 25 UNIT/L (ref 0–55)
AST SERPL-CCNC: 18 UNIT/L (ref 5–34)
BASOPHILS # BLD AUTO: 0.06 X10(3)/MCL
BASOPHILS NFR BLD AUTO: 0.6 %
BILIRUB SERPL-MCNC: 0.6 MG/DL
BUN SERPL-MCNC: 17.8 MG/DL (ref 8.4–25.7)
CALCIUM SERPL-MCNC: 8.9 MG/DL (ref 8.8–10)
CHLORIDE SERPL-SCNC: 109 MMOL/L (ref 98–107)
CHOLEST SERPL-MCNC: 92 MG/DL
CHOLEST/HDLC SERPL: 3 {RATIO} (ref 0–5)
CO2 SERPL-SCNC: 24 MMOL/L (ref 23–31)
CREAT SERPL-MCNC: 0.71 MG/DL (ref 0.73–1.18)
EOSINOPHIL # BLD AUTO: 0.54 X10(3)/MCL (ref 0–0.9)
EOSINOPHIL NFR BLD AUTO: 5 %
ERYTHROCYTE [DISTWIDTH] IN BLOOD BY AUTOMATED COUNT: 13.1 % (ref 11.5–17)
GFR SERPLBLD CREATININE-BSD FMLA CKD-EPI: >60 MLS/MIN/1.73/M2
GLOBULIN SER-MCNC: 2.1 GM/DL (ref 2.4–3.5)
GLUCOSE SERPL-MCNC: 77 MG/DL (ref 82–115)
HCT VFR BLD AUTO: 41.6 % (ref 42–52)
HDLC SERPL-MCNC: 36 MG/DL (ref 35–60)
HGB BLD-MCNC: 14 G/DL (ref 14–18)
IMM GRANULOCYTES # BLD AUTO: 0.04 X10(3)/MCL (ref 0–0.04)
IMM GRANULOCYTES NFR BLD AUTO: 0.4 %
LDLC SERPL CALC-MCNC: 41 MG/DL (ref 50–140)
LYMPHOCYTES # BLD AUTO: 2.35 X10(3)/MCL (ref 0.6–4.6)
LYMPHOCYTES NFR BLD AUTO: 21.6 %
MCH RBC QN AUTO: 31.5 PG (ref 27–31)
MCHC RBC AUTO-ENTMCNC: 33.7 G/DL (ref 33–36)
MCV RBC AUTO: 93.7 FL (ref 80–94)
MONOCYTES # BLD AUTO: 0.95 X10(3)/MCL (ref 0.1–1.3)
MONOCYTES NFR BLD AUTO: 8.7 %
NEUTROPHILS # BLD AUTO: 6.92 X10(3)/MCL (ref 2.1–9.2)
NEUTROPHILS NFR BLD AUTO: 63.7 %
NRBC BLD AUTO-RTO: 0 %
PLATELET # BLD AUTO: 205 X10(3)/MCL (ref 130–400)
PMV BLD AUTO: 11.5 FL (ref 7.4–10.4)
POTASSIUM SERPL-SCNC: 4.2 MMOL/L (ref 3.5–5.1)
PROT SERPL-MCNC: 5.7 GM/DL (ref 5.8–7.6)
RBC # BLD AUTO: 4.44 X10(6)/MCL (ref 4.7–6.1)
SODIUM SERPL-SCNC: 143 MMOL/L (ref 136–145)
TRIGL SERPL-MCNC: 76 MG/DL (ref 34–140)
VLDLC SERPL CALC-MCNC: 15 MG/DL
WBC # SPEC AUTO: 10.86 X10(3)/MCL (ref 4.5–11.5)

## 2023-10-24 PROCEDURE — 85025 COMPLETE CBC W/AUTO DIFF WBC: CPT | Performed by: INTERNAL MEDICINE

## 2023-10-24 PROCEDURE — 80053 COMPREHEN METABOLIC PANEL: CPT | Performed by: INTERNAL MEDICINE

## 2023-10-24 PROCEDURE — 80061 LIPID PANEL: CPT | Performed by: INTERNAL MEDICINE

## 2024-02-01 ENCOUNTER — LAB REQUISITION (OUTPATIENT)
Dept: LAB | Facility: HOSPITAL | Age: 64
End: 2024-02-01
Payer: MEDICAID

## 2024-02-01 DIAGNOSIS — D72.829 ELEVATED WHITE BLOOD CELL COUNT, UNSPECIFIED: ICD-10-CM

## 2024-02-01 LAB
APPEARANCE UR: CLEAR
BILIRUB UR QL STRIP.AUTO: NEGATIVE
COLOR UR AUTO: YELLOW
GLUCOSE UR QL STRIP.AUTO: NEGATIVE
KETONES UR QL STRIP.AUTO: NEGATIVE
LEUKOCYTE ESTERASE UR QL STRIP.AUTO: NEGATIVE
NITRITE UR QL STRIP.AUTO: NEGATIVE
PH UR STRIP.AUTO: 6 [PH]
PROT UR QL STRIP.AUTO: NEGATIVE
RBC UR QL AUTO: NEGATIVE
SP GR UR STRIP.AUTO: >=1.03 (ref 1–1.03)
UROBILINOGEN UR STRIP-ACNC: 0.2

## 2024-02-01 PROCEDURE — 81003 URINALYSIS AUTO W/O SCOPE: CPT | Performed by: INTERNAL MEDICINE

## 2024-02-01 PROCEDURE — 87086 URINE CULTURE/COLONY COUNT: CPT | Performed by: INTERNAL MEDICINE

## 2024-02-03 LAB — BACTERIA UR CULT: NORMAL

## 2024-04-11 ENCOUNTER — LAB REQUISITION (OUTPATIENT)
Dept: LAB | Facility: HOSPITAL | Age: 64
End: 2024-04-11
Payer: MEDICAID

## 2024-04-11 DIAGNOSIS — E87.6 HYPOKALEMIA: ICD-10-CM

## 2024-04-11 DIAGNOSIS — I10 ESSENTIAL (PRIMARY) HYPERTENSION: ICD-10-CM

## 2024-04-11 LAB
ALBUMIN SERPL-MCNC: 3.7 G/DL (ref 3.4–4.8)
ALBUMIN/GLOB SERPL: 1.5 RATIO (ref 1.1–2)
ALP SERPL-CCNC: 111 UNIT/L (ref 40–150)
ALT SERPL-CCNC: 36 UNIT/L (ref 0–55)
AST SERPL-CCNC: 21 UNIT/L (ref 5–34)
BASOPHILS # BLD AUTO: 0.04 X10(3)/MCL
BASOPHILS NFR BLD AUTO: 0.4 %
BILIRUB SERPL-MCNC: 0.6 MG/DL
BUN SERPL-MCNC: 17.9 MG/DL (ref 8.4–25.7)
CALCIUM SERPL-MCNC: 8.8 MG/DL (ref 8.8–10)
CHLORIDE SERPL-SCNC: 107 MMOL/L (ref 98–107)
CHOLEST SERPL-MCNC: 98 MG/DL
CHOLEST/HDLC SERPL: 2 {RATIO} (ref 0–5)
CO2 SERPL-SCNC: 26 MMOL/L (ref 23–31)
CREAT SERPL-MCNC: 0.79 MG/DL (ref 0.73–1.18)
EOSINOPHIL # BLD AUTO: 0.3 X10(3)/MCL (ref 0–0.9)
EOSINOPHIL NFR BLD AUTO: 3.1 %
ERYTHROCYTE [DISTWIDTH] IN BLOOD BY AUTOMATED COUNT: 13.9 % (ref 11.5–17)
GFR SERPLBLD CREATININE-BSD FMLA CKD-EPI: >60 MLS/MIN/1.73/M2
GLOBULIN SER-MCNC: 2.5 GM/DL (ref 2.4–3.5)
GLUCOSE SERPL-MCNC: 107 MG/DL (ref 82–115)
HCT VFR BLD AUTO: 44.3 % (ref 42–52)
HDLC SERPL-MCNC: 44 MG/DL (ref 35–60)
HGB BLD-MCNC: 14.4 G/DL (ref 14–18)
IMM GRANULOCYTES # BLD AUTO: 0.11 X10(3)/MCL (ref 0–0.04)
IMM GRANULOCYTES NFR BLD AUTO: 1.1 %
LDLC SERPL CALC-MCNC: 45 MG/DL (ref 50–140)
LYMPHOCYTES # BLD AUTO: 2.7 X10(3)/MCL (ref 0.6–4.6)
LYMPHOCYTES NFR BLD AUTO: 27.5 %
MCH RBC QN AUTO: 30.9 PG (ref 27–31)
MCHC RBC AUTO-ENTMCNC: 32.5 G/DL (ref 33–36)
MCV RBC AUTO: 95.1 FL (ref 80–94)
MONOCYTES # BLD AUTO: 0.65 X10(3)/MCL (ref 0.1–1.3)
MONOCYTES NFR BLD AUTO: 6.6 %
NEUTROPHILS # BLD AUTO: 6.02 X10(3)/MCL (ref 2.1–9.2)
NEUTROPHILS NFR BLD AUTO: 61.3 %
NRBC BLD AUTO-RTO: 0 %
PLATELET # BLD AUTO: 258 X10(3)/MCL (ref 130–400)
PMV BLD AUTO: 11.3 FL (ref 7.4–10.4)
POTASSIUM SERPL-SCNC: 4.4 MMOL/L (ref 3.5–5.1)
PROT SERPL-MCNC: 6.2 GM/DL (ref 5.8–7.6)
RBC # BLD AUTO: 4.66 X10(6)/MCL (ref 4.7–6.1)
SODIUM SERPL-SCNC: 142 MMOL/L (ref 136–145)
TRIGL SERPL-MCNC: 43 MG/DL (ref 34–140)
VLDLC SERPL CALC-MCNC: 9 MG/DL
WBC # SPEC AUTO: 9.82 X10(3)/MCL (ref 4.5–11.5)

## 2024-04-11 PROCEDURE — 80061 LIPID PANEL: CPT | Performed by: INTERNAL MEDICINE

## 2024-04-11 PROCEDURE — 80053 COMPREHEN METABOLIC PANEL: CPT | Performed by: INTERNAL MEDICINE

## 2024-04-11 PROCEDURE — 85025 COMPLETE CBC W/AUTO DIFF WBC: CPT | Performed by: INTERNAL MEDICINE

## 2024-10-17 ENCOUNTER — LAB REQUISITION (OUTPATIENT)
Dept: LAB | Facility: HOSPITAL | Age: 64
End: 2024-10-17
Payer: MEDICAID

## 2024-10-17 DIAGNOSIS — I10 ESSENTIAL (PRIMARY) HYPERTENSION: ICD-10-CM

## 2024-10-17 LAB
ALBUMIN SERPL-MCNC: 4 G/DL (ref 3.4–4.8)
ALBUMIN/GLOB SERPL: 1.5 RATIO (ref 1.1–2)
ALP SERPL-CCNC: 120 UNIT/L (ref 40–150)
ALT SERPL-CCNC: 26 UNIT/L (ref 0–55)
ANION GAP SERPL CALC-SCNC: 8 MEQ/L
AST SERPL-CCNC: 18 UNIT/L (ref 5–34)
BASOPHILS # BLD AUTO: 0.04 X10(3)/MCL
BASOPHILS NFR BLD AUTO: 0.4 %
BILIRUB SERPL-MCNC: 0.7 MG/DL
BUN SERPL-MCNC: 13.5 MG/DL (ref 8.4–25.7)
CALCIUM SERPL-MCNC: 9.3 MG/DL (ref 8.8–10)
CHLORIDE SERPL-SCNC: 105 MMOL/L (ref 98–107)
CHOLEST SERPL-MCNC: 116 MG/DL
CHOLEST/HDLC SERPL: 2 {RATIO} (ref 0–5)
CO2 SERPL-SCNC: 27 MMOL/L (ref 23–31)
CREAT SERPL-MCNC: 0.89 MG/DL (ref 0.73–1.18)
CREAT/UREA NIT SERPL: 15
EOSINOPHIL # BLD AUTO: 0.31 X10(3)/MCL (ref 0–0.9)
EOSINOPHIL NFR BLD AUTO: 3.2 %
ERYTHROCYTE [DISTWIDTH] IN BLOOD BY AUTOMATED COUNT: 13.4 % (ref 11.5–17)
GFR SERPLBLD CREATININE-BSD FMLA CKD-EPI: >60 ML/MIN/1.73/M2
GLOBULIN SER-MCNC: 2.6 GM/DL (ref 2.4–3.5)
GLUCOSE SERPL-MCNC: 121 MG/DL (ref 82–115)
HCT VFR BLD AUTO: 47 % (ref 42–52)
HDLC SERPL-MCNC: 51 MG/DL (ref 35–60)
HGB BLD-MCNC: 15.5 G/DL (ref 14–18)
IMM GRANULOCYTES # BLD AUTO: 0.06 X10(3)/MCL (ref 0–0.04)
IMM GRANULOCYTES NFR BLD AUTO: 0.6 %
LDLC SERPL CALC-MCNC: 51 MG/DL (ref 50–140)
LYMPHOCYTES # BLD AUTO: 1.72 X10(3)/MCL (ref 0.6–4.6)
LYMPHOCYTES NFR BLD AUTO: 17.8 %
MCH RBC QN AUTO: 31.2 PG (ref 27–31)
MCHC RBC AUTO-ENTMCNC: 33 G/DL (ref 33–36)
MCV RBC AUTO: 94.6 FL (ref 80–94)
MONOCYTES # BLD AUTO: 0.67 X10(3)/MCL (ref 0.1–1.3)
MONOCYTES NFR BLD AUTO: 7 %
NEUTROPHILS # BLD AUTO: 6.84 X10(3)/MCL (ref 2.1–9.2)
NEUTROPHILS NFR BLD AUTO: 71 %
NRBC BLD AUTO-RTO: 0 %
PLATELET # BLD AUTO: 228 X10(3)/MCL (ref 130–400)
PMV BLD AUTO: 11.4 FL (ref 7.4–10.4)
POTASSIUM SERPL-SCNC: 4.8 MMOL/L (ref 3.5–5.1)
PROT SERPL-MCNC: 6.6 GM/DL (ref 5.8–7.6)
RBC # BLD AUTO: 4.97 X10(6)/MCL (ref 4.7–6.1)
SODIUM SERPL-SCNC: 140 MMOL/L (ref 136–145)
TRIGL SERPL-MCNC: 70 MG/DL (ref 34–140)
VLDLC SERPL CALC-MCNC: 14 MG/DL
WBC # BLD AUTO: 9.64 X10(3)/MCL (ref 4.5–11.5)

## 2024-10-17 PROCEDURE — 80053 COMPREHEN METABOLIC PANEL: CPT | Performed by: INTERNAL MEDICINE

## 2024-10-17 PROCEDURE — 80061 LIPID PANEL: CPT | Performed by: INTERNAL MEDICINE

## 2024-10-17 PROCEDURE — 85025 COMPLETE CBC W/AUTO DIFF WBC: CPT | Performed by: INTERNAL MEDICINE

## 2025-04-09 ENCOUNTER — LAB REQUISITION (OUTPATIENT)
Dept: LAB | Facility: HOSPITAL | Age: 65
End: 2025-04-09
Payer: MEDICARE

## 2025-04-09 DIAGNOSIS — E78.5 HYPERLIPIDEMIA, UNSPECIFIED: ICD-10-CM

## 2025-04-09 LAB
ALBUMIN SERPL-MCNC: 3.5 G/DL (ref 3.4–4.8)
ALBUMIN/GLOB SERPL: 1.8 RATIO (ref 1.1–2)
ALP SERPL-CCNC: 105 UNIT/L (ref 40–150)
ALT SERPL-CCNC: 28 UNIT/L (ref 0–55)
ANION GAP SERPL CALC-SCNC: 7 MEQ/L
AST SERPL-CCNC: 17 UNIT/L (ref 11–45)
BASOPHILS # BLD AUTO: 0.06 X10(3)/MCL
BASOPHILS NFR BLD AUTO: 0.6 %
BILIRUB SERPL-MCNC: 0.7 MG/DL
BUN SERPL-MCNC: 15 MG/DL (ref 8.4–25.7)
CALCIUM SERPL-MCNC: 8.6 MG/DL (ref 8.8–10)
CHLORIDE SERPL-SCNC: 110 MMOL/L (ref 98–107)
CHOLEST SERPL-MCNC: 84 MG/DL
CHOLEST/HDLC SERPL: 2 {RATIO} (ref 0–5)
CO2 SERPL-SCNC: 25 MMOL/L (ref 23–31)
CREAT SERPL-MCNC: 0.83 MG/DL (ref 0.72–1.25)
CREAT/UREA NIT SERPL: 18
EOSINOPHIL # BLD AUTO: 0.42 X10(3)/MCL (ref 0–0.9)
EOSINOPHIL NFR BLD AUTO: 4 %
ERYTHROCYTE [DISTWIDTH] IN BLOOD BY AUTOMATED COUNT: 13.5 % (ref 11.5–17)
GFR SERPLBLD CREATININE-BSD FMLA CKD-EPI: >60 ML/MIN/1.73/M2
GLOBULIN SER-MCNC: 2 GM/DL (ref 2.4–3.5)
GLUCOSE SERPL-MCNC: 85 MG/DL (ref 82–115)
HCT VFR BLD AUTO: 41.6 % (ref 42–52)
HDLC SERPL-MCNC: 39 MG/DL (ref 35–60)
HGB BLD-MCNC: 13.7 G/DL (ref 14–18)
IMM GRANULOCYTES # BLD AUTO: 0.08 X10(3)/MCL (ref 0–0.04)
IMM GRANULOCYTES NFR BLD AUTO: 0.8 %
LDLC SERPL CALC-MCNC: 38 MG/DL (ref 50–140)
LYMPHOCYTES # BLD AUTO: 1.99 X10(3)/MCL (ref 0.6–4.6)
LYMPHOCYTES NFR BLD AUTO: 18.9 %
MCH RBC QN AUTO: 30.6 PG (ref 27–31)
MCHC RBC AUTO-ENTMCNC: 32.9 G/DL (ref 33–36)
MCV RBC AUTO: 93.1 FL (ref 80–94)
MONOCYTES # BLD AUTO: 0.95 X10(3)/MCL (ref 0.1–1.3)
MONOCYTES NFR BLD AUTO: 9 %
NEUTROPHILS # BLD AUTO: 7.01 X10(3)/MCL (ref 2.1–9.2)
NEUTROPHILS NFR BLD AUTO: 66.7 %
NRBC BLD AUTO-RTO: 0 %
PLATELET # BLD AUTO: 236 X10(3)/MCL (ref 130–400)
PMV BLD AUTO: 11.7 FL (ref 7.4–10.4)
POTASSIUM SERPL-SCNC: 4.2 MMOL/L (ref 3.5–5.1)
PROT SERPL-MCNC: 5.5 GM/DL (ref 5.8–7.6)
RBC # BLD AUTO: 4.47 X10(6)/MCL (ref 4.7–6.1)
SODIUM SERPL-SCNC: 142 MMOL/L (ref 136–145)
TRIGL SERPL-MCNC: 37 MG/DL (ref 34–140)
VLDLC SERPL CALC-MCNC: 7 MG/DL
WBC # BLD AUTO: 10.51 X10(3)/MCL (ref 4.5–11.5)

## 2025-04-09 PROCEDURE — 80053 COMPREHEN METABOLIC PANEL: CPT | Performed by: INTERNAL MEDICINE

## 2025-04-09 PROCEDURE — 85025 COMPLETE CBC W/AUTO DIFF WBC: CPT | Performed by: INTERNAL MEDICINE

## 2025-04-09 PROCEDURE — 80061 LIPID PANEL: CPT | Performed by: INTERNAL MEDICINE
